# Patient Record
Sex: FEMALE | Race: OTHER | HISPANIC OR LATINO | URBAN - METROPOLITAN AREA
[De-identification: names, ages, dates, MRNs, and addresses within clinical notes are randomized per-mention and may not be internally consistent; named-entity substitution may affect disease eponyms.]

---

## 2021-05-20 ENCOUNTER — EMERGENCY (EMERGENCY)
Facility: HOSPITAL | Age: 36
LOS: 1 days | Discharge: HOME | End: 2021-05-20
Attending: EMERGENCY MEDICINE
Payer: MEDICAID

## 2021-05-20 VITALS
SYSTOLIC BLOOD PRESSURE: 110 MMHG | OXYGEN SATURATION: 97 % | WEIGHT: 142.86 LBS | RESPIRATION RATE: 16 BRPM | DIASTOLIC BLOOD PRESSURE: 74 MMHG | HEART RATE: 68 BPM | TEMPERATURE: 98 F

## 2021-05-20 VITALS
OXYGEN SATURATION: 98 % | SYSTOLIC BLOOD PRESSURE: 100 MMHG | DIASTOLIC BLOOD PRESSURE: 63 MMHG | TEMPERATURE: 99 F | HEART RATE: 58 BPM | RESPIRATION RATE: 18 BRPM

## 2021-05-20 LAB
ALBUMIN SERPL ELPH-MCNC: 3.5 G/DL — SIGNIFICANT CHANGE UP (ref 3.5–5)
ALP SERPL-CCNC: 85 U/L — SIGNIFICANT CHANGE UP (ref 40–120)
ALT FLD-CCNC: 35 U/L DA — SIGNIFICANT CHANGE UP (ref 10–60)
ANION GAP SERPL CALC-SCNC: 7 MMOL/L — SIGNIFICANT CHANGE UP (ref 5–17)
AST SERPL-CCNC: 14 U/L — SIGNIFICANT CHANGE UP (ref 10–40)
BASOPHILS # BLD AUTO: 0.02 K/UL — SIGNIFICANT CHANGE UP (ref 0–0.2)
BASOPHILS NFR BLD AUTO: 0.2 % — SIGNIFICANT CHANGE UP (ref 0–2)
BILIRUB SERPL-MCNC: 0.6 MG/DL — SIGNIFICANT CHANGE UP (ref 0.2–1.2)
BUN SERPL-MCNC: 22 MG/DL — HIGH (ref 7–18)
CALCIUM SERPL-MCNC: 8.8 MG/DL — SIGNIFICANT CHANGE UP (ref 8.4–10.5)
CHLORIDE SERPL-SCNC: 108 MMOL/L — SIGNIFICANT CHANGE UP (ref 96–108)
CO2 SERPL-SCNC: 24 MMOL/L — SIGNIFICANT CHANGE UP (ref 22–31)
CREAT SERPL-MCNC: 0.73 MG/DL — SIGNIFICANT CHANGE UP (ref 0.5–1.3)
D DIMER BLD IA.RAPID-MCNC: 201 NG/ML DDU — SIGNIFICANT CHANGE UP
EOSINOPHIL # BLD AUTO: 0.01 K/UL — SIGNIFICANT CHANGE UP (ref 0–0.5)
EOSINOPHIL NFR BLD AUTO: 0.1 % — SIGNIFICANT CHANGE UP (ref 0–6)
GLUCOSE SERPL-MCNC: 103 MG/DL — HIGH (ref 70–99)
HCG SERPL-ACNC: <1 MIU/ML — SIGNIFICANT CHANGE UP
HCT VFR BLD CALC: 31.4 % — LOW (ref 34.5–45)
HGB BLD-MCNC: 10.1 G/DL — LOW (ref 11.5–15.5)
HIV 1 & 2 AB SERPL IA.RAPID: SIGNIFICANT CHANGE UP
IMM GRANULOCYTES NFR BLD AUTO: 0.4 % — SIGNIFICANT CHANGE UP (ref 0–1.5)
LYMPHOCYTES # BLD AUTO: 2.17 K/UL — SIGNIFICANT CHANGE UP (ref 1–3.3)
LYMPHOCYTES # BLD AUTO: 24.2 % — SIGNIFICANT CHANGE UP (ref 13–44)
MAGNESIUM SERPL-MCNC: 2.2 MG/DL — SIGNIFICANT CHANGE UP (ref 1.6–2.6)
MCHC RBC-ENTMCNC: 27.2 PG — SIGNIFICANT CHANGE UP (ref 27–34)
MCHC RBC-ENTMCNC: 32.2 GM/DL — SIGNIFICANT CHANGE UP (ref 32–36)
MCV RBC AUTO: 84.4 FL — SIGNIFICANT CHANGE UP (ref 80–100)
MONOCYTES # BLD AUTO: 0.91 K/UL — HIGH (ref 0–0.9)
MONOCYTES NFR BLD AUTO: 10.2 % — SIGNIFICANT CHANGE UP (ref 2–14)
NEUTROPHILS # BLD AUTO: 5.8 K/UL — SIGNIFICANT CHANGE UP (ref 1.8–7.4)
NEUTROPHILS NFR BLD AUTO: 64.9 % — SIGNIFICANT CHANGE UP (ref 43–77)
NRBC # BLD: 0 /100 WBCS — SIGNIFICANT CHANGE UP (ref 0–0)
NT-PROBNP SERPL-SCNC: 208 PG/ML — HIGH (ref 0–125)
PLATELET # BLD AUTO: 408 K/UL — HIGH (ref 150–400)
POTASSIUM SERPL-MCNC: 3.8 MMOL/L — SIGNIFICANT CHANGE UP (ref 3.5–5.3)
POTASSIUM SERPL-SCNC: 3.8 MMOL/L — SIGNIFICANT CHANGE UP (ref 3.5–5.3)
PROT SERPL-MCNC: 7.7 G/DL — SIGNIFICANT CHANGE UP (ref 6–8.3)
RBC # BLD: 3.72 M/UL — LOW (ref 3.8–5.2)
RBC # FLD: 13.7 % — SIGNIFICANT CHANGE UP (ref 10.3–14.5)
SODIUM SERPL-SCNC: 139 MMOL/L — SIGNIFICANT CHANGE UP (ref 135–145)
TROPONIN I SERPL-MCNC: <0.015 NG/ML — SIGNIFICANT CHANGE UP (ref 0–0.04)
WBC # BLD: 8.95 K/UL — SIGNIFICANT CHANGE UP (ref 3.8–10.5)
WBC # FLD AUTO: 8.95 K/UL — SIGNIFICANT CHANGE UP (ref 3.8–10.5)

## 2021-05-20 PROCEDURE — 85025 COMPLETE CBC W/AUTO DIFF WBC: CPT

## 2021-05-20 PROCEDURE — 85379 FIBRIN DEGRADATION QUANT: CPT

## 2021-05-20 PROCEDURE — 96374 THER/PROPH/DIAG INJ IV PUSH: CPT

## 2021-05-20 PROCEDURE — 93010 ELECTROCARDIOGRAM REPORT: CPT

## 2021-05-20 PROCEDURE — 99285 EMERGENCY DEPT VISIT HI MDM: CPT

## 2021-05-20 PROCEDURE — 99284 EMERGENCY DEPT VISIT MOD MDM: CPT | Mod: 25

## 2021-05-20 PROCEDURE — 36415 COLL VENOUS BLD VENIPUNCTURE: CPT

## 2021-05-20 PROCEDURE — 84484 ASSAY OF TROPONIN QUANT: CPT

## 2021-05-20 PROCEDURE — 84702 CHORIONIC GONADOTROPIN TEST: CPT

## 2021-05-20 PROCEDURE — 80053 COMPREHEN METABOLIC PANEL: CPT

## 2021-05-20 PROCEDURE — 93005 ELECTROCARDIOGRAM TRACING: CPT

## 2021-05-20 PROCEDURE — 86703 HIV-1/HIV-2 1 RESULT ANTBDY: CPT

## 2021-05-20 PROCEDURE — 83880 ASSAY OF NATRIURETIC PEPTIDE: CPT

## 2021-05-20 PROCEDURE — 83735 ASSAY OF MAGNESIUM: CPT

## 2021-05-20 RX ORDER — SODIUM CHLORIDE 9 MG/ML
1000 INJECTION INTRAMUSCULAR; INTRAVENOUS; SUBCUTANEOUS ONCE
Refills: 0 | Status: COMPLETED | OUTPATIENT
Start: 2021-05-20 | End: 2021-05-20

## 2021-05-20 RX ORDER — KETOROLAC TROMETHAMINE 30 MG/ML
15 SYRINGE (ML) INJECTION ONCE
Refills: 0 | Status: DISCONTINUED | OUTPATIENT
Start: 2021-05-20 | End: 2021-05-20

## 2021-05-20 RX ADMIN — Medication 15 MILLIGRAM(S): at 12:42

## 2021-05-20 RX ADMIN — Medication 15 MILLIGRAM(S): at 13:24

## 2021-05-20 RX ADMIN — SODIUM CHLORIDE 1000 MILLILITER(S): 9 INJECTION INTRAMUSCULAR; INTRAVENOUS; SUBCUTANEOUS at 12:42

## 2021-05-20 NOTE — ED PROVIDER NOTE - PATIENT PORTAL LINK FT
You can access the FollowMyHealth Patient Portal offered by [facility] by registering at the following website: [email or web address]. By joining [employer]’s FollowMyHealth portal, you will also be able to view your health information using other applications (apps) compatible with our system.

## 2021-05-20 NOTE — ED PROVIDER NOTE - PROVIDER TOKENS
PROVIDER:[TOKEN:[[id number]],FOLLOWUP:[1-3 Days]],PROVIDER:[TOKEN:[[id number]],FOLLOWUP:[1-3 Days]]

## 2021-05-20 NOTE — ED PROVIDER NOTE - CLINICAL SUMMARY MEDICAL DECISION MAKING FREE TEXT BOX
Character low suspicion for SAH. No e/o elevated ICP and low suspicion for intracranial mass. No fever or meningeal signs. No e/o glaucoma. Character low suspicion for temporal arteritis. Character low suspicion for CVA or dissection and no focal or localizing findings. Character low suspicion for PE and PERC and D-dimer negative. Character low suspicion for ACS and HEART score 0. No e/o myocarditis/cardiomyopathy. No e/o PNA or PTX on exam or reviewed CXR read. May be 2/2 anxiety in light of high stress trigger and coincidence. Patient is well appearing, NAD, afebrile, hemodynamically stable. Given fluids, Toradol with _______. Any available tests and studies were discussed with patient. Discharged with instructions in further symptomatic care, return precautions, and need for PMD, cardio, neuro f/u. Character low suspicion for SAH. No e/o elevated ICP and low suspicion for intracranial mass. No fever or meningeal signs. No e/o glaucoma. Character low suspicion for temporal arteritis. Character low suspicion for CVA or dissection and no focal or localizing findings. Character low suspicion for PE and PERC and D-dimer negative. Character low suspicion for ACS and HEART score 0. No e/o myocarditis/cardiomyopathy. No e/o PNA or PTX on exam or reviewed CXR read. May be 2/2 anxiety in light of high stress trigger and coincidence. Patient is well appearing, NAD, afebrile, hemodynamically stable. Given fluids, Toradol with improvement and comfortable with discharge. Any available tests and studies were discussed with patient. Discharged with instructions in further symptomatic care, return precautions, and need for PMD f/u - [facility] as needs a doctor with no insurance. Discharge  [id number]

## 2021-05-20 NOTE — ED PROVIDER NOTE - OBJECTIVE STATEMENT
[id number].  36yoF with h/o HTN on enalapril, presents with multiple complaints. States x 4 days having an intermittent nonradiating bitemporal HA, improves with Tylenol then returns. Associated lightheadedness, feeling of fast HR, very intermittent midsternal nonradiating CP and SOB. Onset of symptoms was after waking up from sleep and speaking to her mother in [facility] who told her that her baby was sick, and then a little after that had another stressful call.  has had multiple similar episodes x 3 yrs, triggered by anxiety. Takes a foreign OTC medicine for this without improvement. Had a normal CXR and ECG and negative COVID test at urgent care, which I reviewed. Denies recent long distance travel, OCP use, leg swelling, fever, cough, COVID exposure, Fhx of CAD, and all other symptoms.

## 2021-05-20 NOTE — ED PROVIDER NOTE - CARE PROVIDER_API CALL
Vimal Reveles  CARDIOVASCULAR DISEASE  [address], [address]  [place] [address]  Phone: [phone]  Fax: [phone]  Follow Up Time: 1-3 Days    Grace Brunson  NEUROLOGY  [id number] [address] [place] [id number]  Phone: [phone]  Fax: [phone]  Follow Up Time: 1-3 Days

## 2021-05-20 NOTE — ED ADULT NURSE NOTE - NSIMPLEMENTINTERV_GEN_ALL_ED
Implemented All Universal Safety Interventions:  [employer] to call system. Call bell, personal items and telephone within reach. Instruct patient to call for assistance. Room bathroom lighting operational. Non-slip footwear when patient is off stretcher. Physically safe environment: no spills, clutter or unnecessary equipment. Stretcher in lowest position, wheels locked, appropriate side rails in place.

## 2021-05-20 NOTE — ED PROVIDER NOTE - NSFOLLOWUPINSTRUCTIONS_ED_ALL_ED_FT
Yulisa un seguimiento con pugh médico de atención primaria, neurólogo y cardiólogo en 1-2 días.  Use acetaminofén o ibuprofeno según sea necesario para el dolor.  Manténgase deborah hidratado.  Regrese al departamento de emergencias si el dolor empeora, la falta de aire, los vómitos, la fiebre o cualquier otro síntoma.      Dolor de bisi general sin causa    General Headache Without Cause      El dolor de bisi es un dolor o malestar que se siente en la dodie de la bisi o del tatum. Hay muchas causas y tipos de raghu de bisi. En algunos casos, es posible que no se encuentre la causa.      Siga estas indicaciones en pugh casa:    Controle pugh afección para detectar cualquier cambio. Infórmele a pugh médico acerca de los cambios. Siga estos pasos para controlar pugh afección:      Control del dolor                   •[place] los medicamentos de venta marcio y los recetados solamente chi se lo haya indicado el médico.      •Cuando sienta dolor de bisi acuéstese en un cuarto oscuro y tranquilo.    •Si se lo indican, aplíquese hielo en la bisi y en la dodie del tatum:  •Ponga el hielo en marlon bolsa plástica.      •Coloque marlon toalla entre la piel y la bolsa.      •Coloque el hielo german 20 minutos, 2 a 3 veces al día.      •Si se lo indican, aplique calor en la dodie afectada. Use la camilo de calor que el médico le recomiende, chi marlon compresa de calor húmedo o marlon almohadilla térmica.   • Coloque marlon toalla entre la piel y la camilo de calor.       •Aplique calor german 20 a 30 minutos.       •Retire la camilo de calor si la piel se pone de color antoine brillante. Chadron es muy importante si no puede sentir dolor, calor o frío. Puede correr un riesgo mayor de sufrir quemaduras.        •Mantenga las luces tenues si las luces brillantes le molestan o cady raghu de bisi empeoran.      Comida y bebida     •Mantenga un horario para las comidas.    •Si hill alcohol: •Limite la cantidad que hill a lo siguiente:   •De 0 a 1 medida por día para las mujeres.       • De 0 a 2 medidas por día para los hombres.         •Esté atento a la cantidad de alcohol que hay en las bebidas que apolinar. En los [place], marlon medida equivale a marlon botella de cerveza de 12 oz (355 ml), un vaso de vino de 5 oz (148 ml) o un vaso de marlon bebida alcohólica de cricket graduación de 1½ oz (44 ml).        •Deje de josh cafeína o reduzca la cantidad que consume.        Indicaciones generales    •Lleve un registro diario para averiguar si ciertas cosas provocan los raghu de bisi. Registre, por ejemplo, lo siguiente:  •Lo que usted come y hill.      •El tiempo que duerme.      •Algún cambio en pugh dieta o en los medicamentos.        •Hágase masajes o pruebe otras formas de relajarse.      •Limite el estrés.      •Siéntese con la espalda recta. No contraiga (tensione) los músculos.      • No consuma ningún producto que contenga nicotina o tabaco. Estos incluyen los cigarrillos, el tabaco para mascar y los cigarrillos electrónicos. Si necesita ayuda para dejar de fumar, consulte al médico.      •Yulisa ejercicios con regularidad satnam chi se lo indicó el médico.      •Duerma lo suficiente. Chadron a menudo significa entre 7 y 9 horas de sueño cada noche.      •Concurra a todas las visitas de control chi se lo haya indicado el médico. Chadron es importante.        Comuníquese con un médico si:    •Los medicamentos no logran aliviar los síntomas.      •Tiene un dolor de bisi que es diferente a los otros raghu de bisi.      •Tiene malestar estomacal (náuseas) o vomita.      •Tiene fiebre.        Solicite ayuda inmediatamente si:    •El dolor de bisi empeora rápidamente.      •El dolor empeora después de hacer mucha actividad física.      •Sigue vomitando.      •Presenta rigidez en el tatum.      •Tiene dificultad para ronald.      •Tiene dificultad para hablar.      •Siente dolor en el danii o en el oído.      •Cady músculos están débiles, o pierde el control muscular.      •Pierde el equilibrio o tiene problemas para caminar.      •Siente que va a desvanecerse (perder el conocimiento) o se desmaya.      •Está desorientado (confundido).      •Tiene marlon convulsión.        Resumen    •El dolor de bisi es un dolor o malestar que se siente en la dodie de la bisi o del tatum.      •Hay muchas causas y tipos de raghu de bisi. En algunos casos, es posible que no se encuentre la causa.      •Lleve un diario chi ayuda para determinar la causa de los raghu de bisi. Controle pugh afección para detectar cualquier cambio. Infórmele a pugh médico acerca de los cambios.      •Comuníquese con un médico si tiene un dolor de bisi que es diferente de lo habitual o si el dolor de bisi no se raul con los medicamentos.      •Solicite ayuda de inmediato si el dolor de bisi es muy intenso, vomita, tiene dificultad para ronald, pierde el equilibrio o tiene marlon convulsión.      Esta información no tiene chi fin reemplazar el consejo del médico. Asegúrese de hacerle al médico cualquier pregunta que tenga. Yulisa un seguimiento con pugh médico de atención primaria en 1-2 días.  Use acetaminofén o ibuprofeno según sea necesario para el dolor.  Manténgase deborah hidratado.  Regrese al departamento de emergencias si el dolor empeora, la falta de aire, los vómitos, la fiebre o cualquier otro síntoma.      Dolor de bisi general sin causa    General Headache Without Cause      El dolor de bisi es un dolor o malestar que se siente en la dodie de la bisi o del tatum. Hay muchas causas y tipos de raghu de bisi. En algunos casos, es posible que no se encuentre la causa.      Siga estas indicaciones en pugh casa:    Controle pugh afección para detectar cualquier cambio. Infórmele a pugh médico acerca de los cambios. Siga estos pasos para controlar pugh afección:      Control del dolor                   •[place] los medicamentos de venta marcio y los recetados solamente chi se lo haya indicado el médico.      •Cuando sienta dolor de bisi acuéstese en un cuarto oscuro y tranquilo.    •Si se lo indican, aplíquese hielo en la bisi y en la dodie del tatum:  •Ponga el hielo en marlon bolsa plástica.      •Coloque marlon toalla entre la piel y la bolsa.      •Coloque el hielo german 20 minutos, 2 a 3 veces al día.      •Si se lo indican, aplique calor en la dodie afectada. Use la camilo de calor que el médico le recomiende, chi marlon compresa de calor húmedo o marlon almohadilla térmica.   • Coloque marlon toalla entre la piel y la camilo de calor.       •Aplique calor german 20 a 30 minutos.       •Retire la camilo de calor si la piel se pone de color antoine brillante. [place] es muy importante si no puede sentir dolor, calor o frío. Puede correr un riesgo mayor de sufrir quemaduras.        •Mantenga las luces tenues si las luces brillantes le molestan o cady raghu de bisi empeoran.      Comida y bebida     •Mantenga un horario para las comidas.    •Si hill alcohol: •Limite la cantidad que hill a lo siguiente:   •De 0 a 1 medida por día para las mujeres.       • De 0 a 2 medidas por día para los hombres.         •Esté atento a la cantidad de alcohol que hay en las bebidas que apolinar. En [place], marlon medida equivale a marlon botella de cerveza de 12 oz (355 ml), un vaso de vino de 5 oz (148 ml) o un vaso de marlon bebida alcohólica de cricket graduación de 1½ oz (44 ml).        •Deje de josh cafeína o reduzca la cantidad que consume.        Indicaciones generales    •Lleve un registro diario para averiguar si ciertas cosas provocan los raghu de bisi. Registre, por ejemplo, lo siguiente:  •Lo que usted come y hill.      •El tiempo que duerme.      •Algún cambio en pugh dieta o en los medicamentos.        •Hágase masajes o pruebe otras formas de relajarse.      •Limite el estrés.      •Siéntese con la espalda recta. No contraiga (tensione) los músculos.      • No consuma ningún producto que contenga nicotina o tabaco. Estos incluyen los cigarrillos, el tabaco para mascar y los cigarrillos electrónicos. Si necesita ayuda para dejar de fumar, consulte al médico.      •Yulisa ejercicios con regularidad satnam chi se lo indicó el médico.      •Duerma lo suficiente. [place] a menudo significa entre 7 y 9 horas de sueño cada noche.      •Concurra a todas las visitas de control chi se lo haya indicado el médico. [place] es importante.        Comuníquese con un médico si:    •Los medicamentos no logran aliviar los síntomas.      •Tiene un dolor de bisi que es diferente a los otros raghu de bisi.      •Tiene malestar estomacal (náuseas) o vomita.      •Tiene fiebre.        Solicite ayuda inmediatamente si:    •El dolor de bisi empeora rápidamente.      •El dolor empeora después de hacer mucha actividad física.      •Sigue vomitando.      •Presenta rigidez en el tatum.      •Tiene dificultad para ronald.      •Tiene dificultad para hablar.      •Siente dolor en el danii o en el oído.      •Cady músculos están débiles, o pierde el control muscular.      •Pierde el equilibrio o tiene problemas para caminar.      •Siente que va a desvanecerse (perder el conocimiento) o se desmaya.      •Está desorientado (confundido).      •Tiene marlon convulsión.        Resumen    •El dolor de bisi es un dolor o malestar que se siente en la dodie de la bisi o del tatum.      •Hay muchas causas y tipos de raghu de bisi. En algunos casos, es posible que no se encuentre la causa.      •Lleve un diario chi ayuda para determinar la causa de los raghu de bisi. Controle pugh afección para detectar cualquier cambio. Infórmele a pugh médico acerca de los cambios.      •Comuníquese con un médico si tiene un dolor de bisi que es diferente de lo habitual o si el dolor de bisi no se raul con los medicamentos.      •Solicite ayuda de inmediato si el dolor de bisi es muy intenso, vomita, tiene dificultad para ronald, pierde el equilibrio o tiene marlon convulsión.      Esta información no tiene chi fin reemplazar el consejo del médico. Asegúrese de hacerle al médico cualquier pregunta que tenga.

## 2021-05-20 NOTE — ED PROVIDER NOTE - NSFOLLOWUPCLINICS_GEN_ALL_ED_FT
[facility]  Internal Medicine  [address] [place] [address]  Phone: [phone]  Fax: [phone]  Follow Up Time: 1-3 Days

## 2021-05-20 NOTE — ED PROVIDER NOTE - PHYSICAL EXAMINATION
Afebrile, hemodynamically stable, saturating well  NAD, well appearing, sitting comfortably in bed  Head NCAT  EOMI grossly, anicteric  MMM  No JVD  RRR, nml S1/S2, no m/r/g  Lungs CTAB, no w/r/r  Abd soft, NT, ND, nml BS, no rebound or guarding  AAO, CN's 3-12 grossly intact  FAM spontaneously, no leg cyanosis or edema  Skin warm, well perfused, no rashes or hives

## 2021-06-17 ENCOUNTER — APPOINTMENT (OUTPATIENT)
Dept: INTERNAL MEDICINE | Facility: CLINIC | Age: 36
End: 2021-06-17

## 2021-06-17 ENCOUNTER — OUTPATIENT (OUTPATIENT)
Dept: OUTPATIENT SERVICES | Facility: HOSPITAL | Age: 36
LOS: 1 days | End: 2021-06-17
Payer: MEDICAID

## 2021-06-17 VITALS
DIASTOLIC BLOOD PRESSURE: 80 MMHG | BODY MASS INDEX: 30.01 KG/M2 | TEMPERATURE: 98 F | WEIGHT: 141 LBS | RESPIRATION RATE: 16 BRPM | HEIGHT: 57.5 IN | OXYGEN SATURATION: 99 % | HEART RATE: 68 BPM | SYSTOLIC BLOOD PRESSURE: 121 MMHG

## 2021-06-17 DIAGNOSIS — R51.9 HEADACHE, UNSPECIFIED: ICD-10-CM

## 2021-06-17 DIAGNOSIS — Z82.49 FAMILY HISTORY OF ISCHEMIC HEART DISEASE AND OTHER DISEASES OF THE CIRCULATORY SYSTEM: ICD-10-CM

## 2021-06-17 DIAGNOSIS — Z83.3 FAMILY HISTORY OF DIABETES MELLITUS: ICD-10-CM

## 2021-06-17 DIAGNOSIS — Z00.00 ENCOUNTER FOR GENERAL ADULT MEDICAL EXAMINATION WITHOUT ABNORMAL FINDINGS: ICD-10-CM

## 2021-06-17 DIAGNOSIS — F32.9 MAJOR DEPRESSIVE DISORDER, SINGLE EPISODE, UNSPECIFIED: ICD-10-CM

## 2021-06-17 DIAGNOSIS — R00.2 PALPITATIONS: ICD-10-CM

## 2021-06-17 DIAGNOSIS — F41.9 ANXIETY DISORDER, UNSPECIFIED: ICD-10-CM

## 2021-06-17 DIAGNOSIS — R07.9 CHEST PAIN, UNSPECIFIED: ICD-10-CM

## 2021-06-17 DIAGNOSIS — Z78.9 OTHER SPECIFIED HEALTH STATUS: ICD-10-CM

## 2021-06-17 PROCEDURE — 84443 ASSAY THYROID STIM HORMONE: CPT

## 2021-06-17 PROCEDURE — 36415 COLL VENOUS BLD VENIPUNCTURE: CPT

## 2021-06-17 PROCEDURE — 80061 LIPID PANEL: CPT

## 2021-06-17 PROCEDURE — G0463: CPT

## 2021-07-21 ENCOUNTER — NON-APPOINTMENT (OUTPATIENT)
Age: 36
End: 2021-07-21

## 2021-07-21 LAB
CHOLEST SERPL-MCNC: 165 MG/DL
HDLC SERPL-MCNC: 36 MG/DL
LDLC SERPL CALC-MCNC: 73 MG/DL
NONHDLC SERPL-MCNC: 130 MG/DL
TRIGL SERPL-MCNC: 280 MG/DL
TSH SERPL-ACNC: 1.72 UIU/ML

## 2021-07-21 NOTE — HEALTH RISK ASSESSMENT
[No] : In the past 12 months have you used drugs other than those required for medical reasons? No [1] : 2) Feeling down, depressed, or hopeless for several days (1) [HIV test declined] : HIV test declined [Hepatitis C test declined] : Hepatitis C test declined [] : No [[id number]] : 2

## 2021-07-21 NOTE — REVIEW OF SYSTEMS
[Suicidal] : not suicidal [Insomnia] : no insomnia [Anxiety] : anxiety [Depression] : depression [Negative] : Integumentary [FreeTextEntry5] : as per HPI [de-identified] : as per HPI

## 2021-07-21 NOTE — PHYSICAL EXAM
[Normal] : normal gait, coordination grossly intact, no focal deficits and deep tendon reflexes were 2+ and symmetric [Normal Affect] : the affect was normal [Alert and Oriented x3] : oriented to person, place, and time [Normal Insight/Judgement] : insight and judgment were intact [de-identified] : anxious

## 2021-07-21 NOTE — ASSESSMENT
[FreeTextEntry1] : information provide for pt to apply for Medicaid or sliding scale; \par \par COVDI19 SHOT X2 \par \par HA: likely tension HA; Tylenol; warm compress\par 1 months follow up \par \par CP and palpitation; cxr in urgent care wnl; EKG in ED grossly wnl;' trop in ED negative\par FHx of cardio disease;refer tto cardio \par 1 month follow up\par \par anxiety and depression: \par refer to psych \par \par -Cautions to ED and follow back discussed with pt in details\par  Detail Level: Detailed Body Location Override (Optional - Billing Will Still Be Based On Selected Body Map Location If Applicable): upper L arm Size Of Lesion In Cm (Optional): 0 Body Location Override (Optional - Billing Will Still Be Based On Selected Body Map Location If Applicable): L nasal sidewall

## 2021-07-22 ENCOUNTER — APPOINTMENT (OUTPATIENT)
Dept: OBGYN | Facility: CLINIC | Age: 36
End: 2021-07-22
Payer: COMMERCIAL

## 2021-07-22 ENCOUNTER — OUTPATIENT (OUTPATIENT)
Dept: OUTPATIENT SERVICES | Facility: HOSPITAL | Age: 36
LOS: 1 days | End: 2021-07-22
Payer: SELF-PAY

## 2021-07-22 VITALS
TEMPERATURE: 98.3 F | WEIGHT: 134 LBS | SYSTOLIC BLOOD PRESSURE: 98 MMHG | DIASTOLIC BLOOD PRESSURE: 64 MMHG | HEART RATE: 69 BPM | HEIGHT: 57.5 IN | BODY MASS INDEX: 28.51 KG/M2 | OXYGEN SATURATION: 99 %

## 2021-07-22 DIAGNOSIS — R10.2 PELVIC AND PERINEAL PAIN: ICD-10-CM

## 2021-07-22 DIAGNOSIS — Z00.00 ENCOUNTER FOR GENERAL ADULT MEDICAL EXAMINATION WITHOUT ABNORMAL FINDINGS: ICD-10-CM

## 2021-07-22 PROCEDURE — 87624 HPV HI-RISK TYP POOLED RSLT: CPT

## 2021-07-22 PROCEDURE — 87625 HPV TYPES 16 & 18 ONLY: CPT

## 2021-07-22 PROCEDURE — 87591 N.GONORRHOEAE DNA AMP PROB: CPT

## 2021-07-22 PROCEDURE — 87491 CHLMYD TRACH DNA AMP PROBE: CPT

## 2021-07-22 PROCEDURE — 99203 OFFICE O/P NEW LOW 30 MIN: CPT | Mod: NC

## 2021-07-22 PROCEDURE — G0463: CPT

## 2021-07-22 NOTE — HISTORY OF PRESENT ILLNESS
[Y] : Positive pregnancy history [FreeTextEntry1] : new gyn visit.\par right lower pelvic pain once or twice a week.\par long periods(10days. 3 days heavy and 7 days light.) x 6mo\par \par \par \par  x1\par C/S x2 and BTL\par Laparotomy Left ov cystectomy 14yrs ago for egg size ov cyst.\par \par  [LMPDate] : 5/12/2021 [MensesFreq] : 28 [MensesLength] : 10 [PGHxTotal] : 3 [[facility]] : 2 [PGxPremature] : 1 [PGHxAbortions] : 0 [[facility] Centeriving] : 3 [PGHxABInduced] : 0 [PGHxABSpont] : 0 [PGHxEctopic] : 0 [PGHxMultBirths] : 0

## 2021-07-23 DIAGNOSIS — R10.2 PELVIC AND PERINEAL PAIN: ICD-10-CM

## 2021-07-23 DIAGNOSIS — N92.6 IRREGULAR MENSTRUATION, UNSPECIFIED: ICD-10-CM

## 2021-07-26 LAB
C TRACH RRNA SPEC QL NAA+PROBE: NOT DETECTED
HPV HIGH+LOW RISK DNA PNL CVX: NOT DETECTED
N GONORRHOEA RRNA SPEC QL NAA+PROBE: NOT DETECTED
SOURCE TP AMPLIFICATION: NORMAL

## 2021-07-30 LAB — CYTOLOGY CVX/VAG DOC THIN PREP: ABNORMAL

## 2021-08-02 ENCOUNTER — APPOINTMENT (OUTPATIENT)
Dept: ULTRASOUND IMAGING | Facility: HOSPITAL | Age: 36
End: 2021-08-02

## 2021-08-05 ENCOUNTER — APPOINTMENT (OUTPATIENT)
Dept: OBGYN | Facility: CLINIC | Age: 36
End: 2021-08-05

## 2021-08-05 VITALS
OXYGEN SATURATION: 97 % | WEIGHT: 135 LBS | HEIGHT: 57.5 IN | RESPIRATION RATE: 18 BRPM | DIASTOLIC BLOOD PRESSURE: 72 MMHG | HEART RATE: 91 BPM | SYSTOLIC BLOOD PRESSURE: 107 MMHG | TEMPERATURE: 97.5 F | BODY MASS INDEX: 28.72 KG/M2

## 2021-08-12 ENCOUNTER — APPOINTMENT (OUTPATIENT)
Dept: ULTRASOUND IMAGING | Facility: HOSPITAL | Age: 36
End: 2021-08-12
Payer: COMMERCIAL

## 2021-08-12 ENCOUNTER — OUTPATIENT (OUTPATIENT)
Dept: OUTPATIENT SERVICES | Facility: HOSPITAL | Age: 36
LOS: 1 days | End: 2021-08-12
Payer: SELF-PAY

## 2021-08-12 DIAGNOSIS — R10.2 PELVIC AND PERINEAL PAIN: ICD-10-CM

## 2021-08-12 PROCEDURE — 76830 TRANSVAGINAL US NON-OB: CPT | Mod: 26

## 2021-08-12 PROCEDURE — 76830 TRANSVAGINAL US NON-OB: CPT

## 2021-08-12 PROCEDURE — 76856 US EXAM PELVIC COMPLETE: CPT | Mod: 26

## 2021-08-12 PROCEDURE — 76856 US EXAM PELVIC COMPLETE: CPT

## 2021-08-19 ENCOUNTER — APPOINTMENT (OUTPATIENT)
Dept: INTERNAL MEDICINE | Facility: CLINIC | Age: 36
End: 2021-08-19

## 2021-08-19 ENCOUNTER — OUTPATIENT (OUTPATIENT)
Dept: OUTPATIENT SERVICES | Facility: HOSPITAL | Age: 36
LOS: 1 days | End: 2021-08-19
Payer: SELF-PAY

## 2021-08-19 VITALS
SYSTOLIC BLOOD PRESSURE: 106 MMHG | TEMPERATURE: 97.4 F | RESPIRATION RATE: 18 BRPM | DIASTOLIC BLOOD PRESSURE: 69 MMHG | OXYGEN SATURATION: 97 % | HEART RATE: 73 BPM | HEIGHT: 57.5 IN | BODY MASS INDEX: 28.94 KG/M2 | WEIGHT: 136 LBS

## 2021-08-19 DIAGNOSIS — M25.549 PAIN IN JOINTS OF UNSPECIFIED HAND: ICD-10-CM

## 2021-08-19 DIAGNOSIS — R21 RASH AND OTHER NONSPECIFIC SKIN ERUPTION: ICD-10-CM

## 2021-08-19 DIAGNOSIS — R07.9 CHEST PAIN, UNSPECIFIED: ICD-10-CM

## 2021-08-19 DIAGNOSIS — R51.9 HEADACHE, UNSPECIFIED: ICD-10-CM

## 2021-08-19 DIAGNOSIS — Z00.00 ENCOUNTER FOR GENERAL ADULT MEDICAL EXAMINATION WITHOUT ABNORMAL FINDINGS: ICD-10-CM

## 2021-08-19 DIAGNOSIS — R00.2 PALPITATIONS: ICD-10-CM

## 2021-08-19 DIAGNOSIS — F32.9 MAJOR DEPRESSIVE DISORDER, SINGLE EPISODE, UNSPECIFIED: ICD-10-CM

## 2021-08-19 DIAGNOSIS — E78.1 PURE HYPERGLYCERIDEMIA: ICD-10-CM

## 2021-08-19 DIAGNOSIS — F41.9 ANXIETY DISORDER, UNSPECIFIED: ICD-10-CM

## 2021-08-19 PROCEDURE — 86140 C-REACTIVE PROTEIN: CPT

## 2021-08-19 PROCEDURE — 86225 DNA ANTIBODY NATIVE: CPT

## 2021-08-19 PROCEDURE — 86235 NUCLEAR ANTIGEN ANTIBODY: CPT

## 2021-08-19 PROCEDURE — G0463: CPT

## 2021-08-19 PROCEDURE — 85652 RBC SED RATE AUTOMATED: CPT

## 2021-08-19 PROCEDURE — 86200 CCP ANTIBODY: CPT

## 2021-08-19 PROCEDURE — 80053 COMPREHEN METABOLIC PANEL: CPT

## 2021-08-19 PROCEDURE — 86431 RHEUMATOID FACTOR QUANT: CPT

## 2021-08-19 PROCEDURE — 86038 ANTINUCLEAR ANTIBODIES: CPT

## 2021-08-19 PROCEDURE — 85025 COMPLETE CBC W/AUTO DIFF WBC: CPT

## 2021-08-19 PROCEDURE — 86255 FLUORESCENT ANTIBODY SCREEN: CPT

## 2021-08-19 NOTE — PHYSICAL EXAM
[Normal] : normal gait, coordination grossly intact, no focal deficits and deep tendon reflexes were 2+ and symmetric [Normal Affect] : the affect was normal [Alert and Oriented x3] : oriented to person, place, and time [Normal Insight/Judgement] : insight and judgment were intact [de-identified] : anxious

## 2021-08-19 NOTE — HISTORY OF PRESENT ILLNESS
[[employer] ] : provided by [employer]   [de-identified] : 36 Y.O Maltese speaking  F  W/PMHx of chronic HA today for follow  up \par \par went to ED in 05/2021 for multiple complains with palpitation; HA and anxiety; \par had EKG and cbc and cmp and HIV and UA AND PRO bnp/ TROP/dDIMER done shOWed only mild normocytic anemia \par ALSO HAD CXR AND COVID19 TEST DONE IN URGENT CARE BEFORE GO TO ED SHOWED NORMAL RESULT PER ED PROVIDER'S NOTE \par \par \par HA: chronic for 1 yr and half since the pandemic; daily; a lot of stress; no n/v ; no syncope; no dizziness; no focal deficit; no fever or chills; b/l frontal; reported phonophobia  but no photophobia; no migraine HA; Tylenol help; \par now reported that she is still had a lot of HA; now is daily HA and wake her up at night; \par \par palpitation: especially climb the stairs; for 1 months; \par also reported  midsternal CP associate with exertion; no sob; the pain is not reproducible; hx OF Unknown CARDIO DISEASE OF MOTHER WHO IS NEED OPEN HEART SURGERY; MOTHER HAS Been DIAGNOSED WITH CARDIO DISEASE AT AGE 50S \par \par \par anxiety: FEEL A LOT OF STRESS FOR 6 MONTHS; \par GAD7=9 no kike/ no hallucination/ No delusions/ no suicidal/no harmful ideation\par feel desperation since her child is in [facility]  she is worried and her mother has hx of acardiac surgery; \par PHQ9=7\par Denies OF ANY COVID19 INFECTION; \par \par now reported b/l hands joints pain as swelling intermittent for 3 yrs and morning stiffness; no injury or erythema or numbness or tingling \par  [FreeTextEntry1] : [id number] [TWNoteComboBox1] : [place]

## 2021-08-19 NOTE — REVIEW OF SYSTEMS
[Anxiety] : anxiety [Depression] : depression [Negative] : Integumentary [Suicidal] : not suicidal [Insomnia] : no insomnia [FreeTextEntry5] : as per HPI [de-identified] : as per HPI

## 2021-08-19 NOTE — ASSESSMENT
[FreeTextEntry1] : information provide for pt to apply for Medicaid or sliding scale; \par \par COVDI19 SHOT X2 \par \par HA: now daily and wak up at night; \par CNsII-XII grossly wnl; HINTs negative; no cerebellar drift; normal heel to shin test\par will check MRI\par -Cautions to ED and follow back discussed with pt  in details\par \par \par CP and palpitation; cxr in urgent care wnl; EKG in ED grossly wnl;' trop in ED negative\par FHx of cardio disease;refer to cardio \par 1 month follow up\par \par joints pain and stiffness:\par labs as ordered\par check x ray hand \par \par anxiety and depression: \par \par -discussed with pt the option of treatment\par -pt denies of current pregnancy or breast feeding\par -start Lexapro\par -discussed with pt in details that the possible side effect  and adverse reactions of the SSRI medication; pt aware that the medication might increase suicidal ideation at early phase; pt also agree to call 911 immediately if she experience suicidal or harmful thought; pt also understand that if she want to stop this medication, she need to call the clinic back for further instruction\par -refer to psychologist and psychiatrist\par -follow up in 3 weeks\par \par \par infor for good RX provided \par

## 2021-08-19 NOTE — HEALTH RISK ASSESSMENT
[No] : In the past 12 months have you used drugs other than those required for medical reasons? No [1] : 2) Feeling down, depressed, or hopeless for several days (1) [HIV test declined] : HIV test declined [Hepatitis C test declined] : Hepatitis C test declined [Patient reported PAP Smear was normal] : Patient reported PAP Smear was normal [] : No [[id number]] : 2 [PapSmearDate] : 07/2021

## 2021-08-26 ENCOUNTER — OUTPATIENT (OUTPATIENT)
Dept: OUTPATIENT SERVICES | Facility: HOSPITAL | Age: 36
LOS: 1 days | End: 2021-08-26
Payer: SELF-PAY

## 2021-08-26 ENCOUNTER — APPOINTMENT (OUTPATIENT)
Dept: OBGYN | Facility: CLINIC | Age: 36
End: 2021-08-26
Payer: COMMERCIAL

## 2021-08-26 VITALS
TEMPERATURE: 97.2 F | BODY MASS INDEX: 28.94 KG/M2 | WEIGHT: 136 LBS | HEIGHT: 57.5 IN | HEART RATE: 75 BPM | OXYGEN SATURATION: 96 % | RESPIRATION RATE: 16 BRPM | DIASTOLIC BLOOD PRESSURE: 76 MMHG | SYSTOLIC BLOOD PRESSURE: 114 MMHG

## 2021-08-26 DIAGNOSIS — Z00.00 ENCOUNTER FOR GENERAL ADULT MEDICAL EXAMINATION WITHOUT ABNORMAL FINDINGS: ICD-10-CM

## 2021-08-26 DIAGNOSIS — N92.6 IRREGULAR MENSTRUATION, UNSPECIFIED: ICD-10-CM

## 2021-08-26 PROCEDURE — 99212 OFFICE O/P EST SF 10 MIN: CPT

## 2021-08-26 PROCEDURE — G0463: CPT

## 2021-08-26 NOTE — HISTORY OF PRESENT ILLNESS
[FreeTextEntry1] : PT is here to discuss results.\par Reports no longer had pelvic pain.\par Last period was normal, lasted for 4 days.\par \par Pap 7/22/21 wnl, hpv neg\par Pelvic sono 8/12/21 Ut no fibroids, em 4mm. 1.4cm follicular cyst R ov

## 2021-08-30 DIAGNOSIS — N92.6 IRREGULAR MENSTRUATION, UNSPECIFIED: ICD-10-CM

## 2021-09-02 ENCOUNTER — OUTPATIENT (OUTPATIENT)
Dept: OUTPATIENT SERVICES | Facility: HOSPITAL | Age: 36
LOS: 1 days | End: 2021-09-02
Payer: SELF-PAY

## 2021-09-02 ENCOUNTER — APPOINTMENT (OUTPATIENT)
Dept: MRI IMAGING | Facility: HOSPITAL | Age: 36
End: 2021-09-02
Payer: MEDICAID

## 2021-09-02 DIAGNOSIS — R51.9 HEADACHE, UNSPECIFIED: ICD-10-CM

## 2021-09-02 PROCEDURE — 70553 MRI BRAIN STEM W/O & W/DYE: CPT | Mod: 26

## 2021-09-02 PROCEDURE — 73130 X-RAY EXAM OF HAND: CPT | Mod: 26,50

## 2021-09-02 PROCEDURE — 70553 MRI BRAIN STEM W/O & W/DYE: CPT

## 2021-09-02 PROCEDURE — 73130 X-RAY EXAM OF HAND: CPT

## 2021-09-09 ENCOUNTER — OUTPATIENT (OUTPATIENT)
Dept: OUTPATIENT SERVICES | Facility: HOSPITAL | Age: 36
LOS: 1 days | End: 2021-09-09
Payer: SELF-PAY

## 2021-09-09 ENCOUNTER — APPOINTMENT (OUTPATIENT)
Dept: INTERNAL MEDICINE | Facility: CLINIC | Age: 36
End: 2021-09-09

## 2021-09-09 VITALS
DIASTOLIC BLOOD PRESSURE: 61 MMHG | WEIGHT: 138.2 LBS | SYSTOLIC BLOOD PRESSURE: 103 MMHG | BODY MASS INDEX: 29.41 KG/M2 | OXYGEN SATURATION: 98 % | HEART RATE: 73 BPM | HEIGHT: 57.5 IN | RESPIRATION RATE: 18 BRPM | TEMPERATURE: 97.3 F

## 2021-09-09 DIAGNOSIS — Z00.00 ENCOUNTER FOR GENERAL ADULT MEDICAL EXAMINATION WITHOUT ABNORMAL FINDINGS: ICD-10-CM

## 2021-09-09 DIAGNOSIS — R51.9 HEADACHE, UNSPECIFIED: ICD-10-CM

## 2021-09-09 DIAGNOSIS — F41.9 ANXIETY DISORDER, UNSPECIFIED: ICD-10-CM

## 2021-09-09 DIAGNOSIS — F32.9 MAJOR DEPRESSIVE DISORDER, SINGLE EPISODE, UNSPECIFIED: ICD-10-CM

## 2021-09-09 DIAGNOSIS — M25.549 PAIN IN JOINTS OF UNSPECIFIED HAND: ICD-10-CM

## 2021-09-09 DIAGNOSIS — R00.2 PALPITATIONS: ICD-10-CM

## 2021-09-09 LAB
ALBUMIN SERPL ELPH-MCNC: 4.4 G/DL
ALP BLD-CCNC: 84 U/L
ALT SERPL-CCNC: 22 U/L
ANA PAT FLD IF-IMP: ABNORMAL
ANA SER IF-ACNC: ABNORMAL
ANION GAP SERPL CALC-SCNC: 12 MMOL/L
AST SERPL-CCNC: 18 U/L
BASOPHILS # BLD AUTO: 0.02 K/UL
BASOPHILS NFR BLD AUTO: 0.3 %
BILIRUB SERPL-MCNC: 0.4 MG/DL
BUN SERPL-MCNC: 14 MG/DL
CALCIUM SERPL-MCNC: 9.4 MG/DL
CCP AB SER IA-ACNC: >250 UNITS
CENTROMERE IGG SER-ACNC: 2.9 CD:130001892
CHLORIDE SERPL-SCNC: 107 MMOL/L
CO2 SERPL-SCNC: 23 MMOL/L
CREAT SERPL-MCNC: 0.73 MG/DL
CRP SERPL-MCNC: 10 MG/L
DSDNA AB SER-ACNC: <12 IU/ML
ENA JO1 AB SER IA-ACNC: <0.2 AL
ENA RNP AB SER IA-ACNC: 0.5 AL
ENA SM AB SER IA-ACNC: <0.2 AL
EOSINOPHIL # BLD AUTO: 0.15 K/UL
EOSINOPHIL NFR BLD AUTO: 2 %
ERYTHROCYTE [SEDIMENTATION RATE] IN BLOOD BY WESTERGREN METHOD: 32 MM/HR
GLUCOSE SERPL-MCNC: 116 MG/DL
HCT VFR BLD CALC: 34.2 %
HGB BLD-MCNC: 10.5 G/DL
IMM GRANULOCYTES NFR BLD AUTO: 0.3 %
LYMPHOCYTES # BLD AUTO: 1.31 K/UL
LYMPHOCYTES NFR BLD AUTO: 17.3 %
MAN DIFF?: NORMAL
MCHC RBC-ENTMCNC: 27.3 PG
MCHC RBC-ENTMCNC: 30.7 GM/DL
MCV RBC AUTO: 89.1 FL
MONOCYTES # BLD AUTO: 0.67 K/UL
MONOCYTES NFR BLD AUTO: 8.8 %
NEUTROPHILS # BLD AUTO: 5.42 K/UL
NEUTROPHILS NFR BLD AUTO: 71.3 %
PLATELET # BLD AUTO: 333 K/UL
POTASSIUM SERPL-SCNC: 4.2 MMOL/L
PROT SERPL-MCNC: 6.9 G/DL
RBC # BLD: 3.84 M/UL
RBC # FLD: 14.9 %
RF+CCP IGG SER-IMP: ABNORMAL
RHEUMATOID FACT SER QL: >650 IU/ML
SODIUM SERPL-SCNC: 141 MMOL/L
WBC # FLD AUTO: 7.59 K/UL

## 2021-09-09 PROCEDURE — G0463: CPT

## 2021-09-09 NOTE — ASSESSMENT
[FreeTextEntry1] : information provide for pt to apply for Medicaid or sliding scale; \par \par COVDI19 SHOT X2 \par \par HA: now daily and wake up at night; \par CNsII-XII grossly wnl; HINTs negative; no cerebellar drift; normal heel to shin test\par  MRI wnl\par -Cautions to ED and follow back discussed with pt  in details\par \par \par CP and palpitation; cxr in urgent care wnl; EKG in ED grossly wnl;' trop in ED negative\par FHx of cardio disease;refer to cardio again\par \par \par joints pain and stiffness:\par labs showed rheumatoid disease \par check x ray hand wnl\par refer to rheumatologist \par \par anxiety and depression: \par \par -discussed with pt the option of treatment\par -pt denies of current pregnancy or breast feeding\par -improving after taking Lexapro\par -discussed with pt in details that the possible side effect  and adverse reactions of the SSRI medication; pt aware that the medication might increase suicidal ideation at early phase; pt also agree to call 911 immediately if she experience suicidal or harmful thought; pt also understand that if she want to stop this medication, she need to call the clinic back for further instruction\par -refer to psychologist and psychiatrist\par -follow up in 2 months \par \par \par infor for good RX provided \par

## 2021-09-09 NOTE — HEALTH RISK ASSESSMENT
[No] : In the past 12 months have you used drugs other than those required for medical reasons? No [1] : 2) Feeling down, depressed, or hopeless for several days (1) [Patient reported PAP Smear was normal] : Patient reported PAP Smear was normal [HIV test declined] : HIV test declined [Hepatitis C test declined] : Hepatitis C test declined [] : No [[id number]] : 2 [PapSmearDate] : 07/2021

## 2021-09-09 NOTE — PHYSICAL EXAM
[Normal] : normal gait, coordination grossly intact, no focal deficits and deep tendon reflexes were 2+ and symmetric [Normal Affect] : the affect was normal [Alert and Oriented x3] : oriented to person, place, and time [Normal Insight/Judgement] : insight and judgment were intact [de-identified] : anxious

## 2021-09-09 NOTE — HISTORY OF PRESENT ILLNESS
[[employer] ] : provided by [employer]   [de-identified] : 36 Y.O Bengali speaking  F  W/PMHx of chronic HA today for follow  up \par \par went to ED in 05/2021 for multiple complains with palpitation; HA and anxiety; \par had EKG and cbc and cmp and HIV and UA AND PRO bnp/ TROP/dDIMER done shOWed only mild normocytic anemia \par ALSO HAD CXR AND COVID19 TEST DONE IN URGENT CARE BEFORE GO TO ED SHOWED NORMAL RESULT PER ED PROVIDER'S NOTE \par \par \par HA: chronic for 1 yr and half since the pandemic; daily; a lot of stress; no n/v ; no syncope; no dizziness; no focal deficit; no fever or chills; b/l frontal; reported phonophobia  but no photophobia; no migraine HA; Tylenol help; \par now reported that she is still had a lot of HA; now is daily HA and wake her up at night; \par \par palpitation: especially climb the stairs; for 1 months; \par also reported  midsternal CP associate with exertion; no sob; the pain is not reproducible; hx OF Unknown CARDIO DISEASE OF MOTHER WHO IS NEED OPEN HEART SURGERY; MOTHER HAS Been DIAGNOSED WITH CARDIO DISEASE AT AGE 50S \par \par \par anxiety: FEEL A LOT OF STRESS FOR 6 MONTHS; \par GAD7=9 no kike/ no hallucination/ No delusions/ no suicidal/no harmful ideation\par feel desperation since her child is in [facility]  she is worried and her mother has hx of acardiac surgery; \par PHQ9=7\par Denies OF ANY COVID19 INFECTION; \par \par now reported b/l hands joints pain as swelling intermittent for 3 yrs and morning stiffness; no injury or erythema or numbness or tingling \par  [FreeTextEntry1] : [id number] [TWNoteComboBox1] : Macedonian

## 2021-09-09 NOTE — REVIEW OF SYSTEMS
[Anxiety] : anxiety [Depression] : depression [Negative] : Integumentary [Suicidal] : not suicidal [Insomnia] : no insomnia [FreeTextEntry5] : as per HPI [de-identified] : as per HPI

## 2021-09-10 DIAGNOSIS — R00.2 PALPITATIONS: ICD-10-CM

## 2021-09-10 DIAGNOSIS — F41.9 ANXIETY DISORDER, UNSPECIFIED: ICD-10-CM

## 2021-09-10 DIAGNOSIS — R51.9 HEADACHE, UNSPECIFIED: ICD-10-CM

## 2021-09-10 DIAGNOSIS — R07.9 CHEST PAIN, UNSPECIFIED: ICD-10-CM

## 2021-09-10 DIAGNOSIS — M25.549 PAIN IN JOINTS OF UNSPECIFIED HAND: ICD-10-CM

## 2021-09-10 DIAGNOSIS — F32.9 MAJOR DEPRESSIVE DISORDER, SINGLE EPISODE, UNSPECIFIED: ICD-10-CM

## 2021-09-10 DIAGNOSIS — M06.9 RHEUMATOID ARTHRITIS, UNSPECIFIED: ICD-10-CM

## 2021-10-08 ENCOUNTER — OUTPATIENT (OUTPATIENT)
Dept: OUTPATIENT SERVICES | Facility: HOSPITAL | Age: 36
LOS: 1 days | End: 2021-10-08
Payer: SELF-PAY

## 2021-10-08 ENCOUNTER — APPOINTMENT (OUTPATIENT)
Dept: RHEUMATOLOGY | Facility: HOSPITAL | Age: 36
End: 2021-10-08

## 2021-10-08 VITALS
HEIGHT: 57.5 IN | RESPIRATION RATE: 18 BRPM | HEART RATE: 60 BPM | SYSTOLIC BLOOD PRESSURE: 107 MMHG | WEIGHT: 137 LBS | DIASTOLIC BLOOD PRESSURE: 65 MMHG | BODY MASS INDEX: 29.15 KG/M2 | TEMPERATURE: 97.8 F

## 2021-10-08 DIAGNOSIS — R06.00 DYSPNEA, UNSPECIFIED: ICD-10-CM

## 2021-10-08 DIAGNOSIS — M06.9 RHEUMATOID ARTHRITIS, UNSPECIFIED: ICD-10-CM

## 2021-10-08 LAB
HBV CORE AB SER-ACNC: SIGNIFICANT CHANGE UP
HBV CORE IGM SER-ACNC: SIGNIFICANT CHANGE UP
HBV SURFACE AB SER-ACNC: SIGNIFICANT CHANGE UP
HBV SURFACE AG SER-ACNC: SIGNIFICANT CHANGE UP

## 2021-10-08 PROCEDURE — 86480 TB TEST CELL IMMUN MEASURE: CPT

## 2021-10-08 PROCEDURE — 86704 HEP B CORE ANTIBODY TOTAL: CPT

## 2021-10-08 PROCEDURE — 86706 HEP B SURFACE ANTIBODY: CPT

## 2021-10-08 PROCEDURE — G0463: CPT

## 2021-10-08 PROCEDURE — 87340 HEPATITIS B SURFACE AG IA: CPT

## 2021-10-08 PROCEDURE — 86705 HEP B CORE ANTIBODY IGM: CPT

## 2021-10-11 LAB
GAMMA INTERFERON BACKGROUND BLD IA-ACNC: 0.06 IU/ML — SIGNIFICANT CHANGE UP
M TB IFN-G BLD-IMP: NEGATIVE — SIGNIFICANT CHANGE UP
M TB IFN-G CD4+ BCKGRND COR BLD-ACNC: 0 IU/ML — SIGNIFICANT CHANGE UP
M TB IFN-G CD4+CD8+ BCKGRND COR BLD-ACNC: -0.01 IU/ML — SIGNIFICANT CHANGE UP
QUANT TB PLUS MITOGEN MINUS NIL: 9.1 IU/ML — SIGNIFICANT CHANGE UP

## 2021-10-19 NOTE — END OF VISIT
[] : Fellow [FreeTextEntry3] : Patient seen with Dr Mcleod. New onset RA for work up and treatment decision. Low dose steroid in the meantime.

## 2021-10-19 NOTE — HISTORY OF PRESENT ILLNESS
[FreeTextEntry1] : 35 yo F referred to Rheumatology clinic due to chronic joint pain and positive serologies for autoimmune disease. Patient's joint pain started two years ago and has gotten progressively worse in a flare type pattern. Joint pain is also associated with joint swelling and is primarily in her hands, wrist and knees, occasionally shoulder too. It is bilateral usually. There is morning stiffness of greater than hour and her arthritis is improved by the end of the day after doing activities. Ibuprofen and Tylenol and have not helped. Patient is a  and does lots of manual work. She denies rashes, alopecia, or raynauds. She admits to dry eyes, dry mouth, and occasional oral ulcers. Patient endorses chronic headaches, alternating constipation and diarrhea. She admits to dyspnea on exertion and pressure like chest pain with exertion. These symptoms have been in the past year and charting documents an ED visit in May of 2021. She denies fevers, chills, recent travel, sick contacts. No family hx of autoimmune disease. She has had a tubal ligation, so no plans for further pregnancies.

## 2021-10-19 NOTE — PHYSICAL EXAM
[General Appearance - Alert] : alert [General Appearance - In No Acute Distress] : in no acute distress [Sclera] : the sclera and conjunctiva were normal [Neck Appearance] : the appearance of the neck was normal [Auscultation Breath Sounds / Voice Sounds] : lungs were clear to auscultation bilaterally [Heart Rate And Rhythm] : heart rate was normal and rhythm regular [Heart Sounds] : normal S1 and S2 [Edema] : there was no peripheral edema [Abdomen Soft] : soft [Abdomen Tenderness] : non-tender [No Spinal Tenderness] : no spinal tenderness [Abnormal Walk] : normal gait [Motor Tone] : muscle strength and tone were normal [] : no rash [Oriented To Time, Place, And Person] : oriented to person, place, and time [FreeTextEntry1] : Synovitis of the digits of the hands b/l, Synovitis of the knees b/l, 12 tender joints and 3 swollen joints

## 2021-10-19 NOTE — REASON FOR VISIT
[Initial Evaluation] : an initial evaluation [[employer] ] : provided by [employer]   [FreeTextEntry1] : Joint pain [Interpreters_IDNumber] : [id number] [Interpreters_FullName] : Hasmukh

## 2021-10-19 NOTE — ASSESSMENT
[FreeTextEntry1] : 35 yo F with PMHx of Anxiety/Depression on Lexapro presents with chronic progressively worsening polysymmetric joint pain with swelling and associated with morning stiffness that improves by the end of the day. Serologies notable for severely high CCP and RF. LIBAN positive but other Lupus serologies negative. Exam consistent with inflammatory arthritis. Xray of the hands without evidence of inflammatory or degenerative arthritis. Patient with newly diagnosed seropositive nonerosive  Rheumatoid Arthritis. Additionally, concerned with subacute symptoms of dyspnea on exertion newscasting an ILD workup though most probable etiology is anxiety in this age group.\par \par #Seropositive non-erosive RA (CDAI of 30 high severity)\par - Start Prednisone 10 mg daily for symptom control\par - Check Hepatitis B and Quantiferon\par - will discuss DMARD initiation next visit such as Methotrexate pending outcome of dyspnea workup (no further pregnancies due to tubal ligation)\par \par #Dyspnea on exertion/chest pain/ evaluate for ILD\par - referral for PFTs provided\par - referral for TTE provided\par \par RTC in 2 weeks\par \par d/w Dr. Gimenez\par

## 2021-10-22 ENCOUNTER — OUTPATIENT (OUTPATIENT)
Dept: OUTPATIENT SERVICES | Facility: HOSPITAL | Age: 36
LOS: 1 days | End: 2021-10-22
Payer: SELF-PAY

## 2021-10-22 ENCOUNTER — APPOINTMENT (OUTPATIENT)
Dept: RHEUMATOLOGY | Facility: HOSPITAL | Age: 36
End: 2021-10-22

## 2021-10-22 VITALS
TEMPERATURE: 97.8 F | HEART RATE: 70 BPM | RESPIRATION RATE: 16 BRPM | HEIGHT: 69.5 IN | DIASTOLIC BLOOD PRESSURE: 78 MMHG | SYSTOLIC BLOOD PRESSURE: 117 MMHG | WEIGHT: 137 LBS | BODY MASS INDEX: 19.83 KG/M2

## 2021-10-22 DIAGNOSIS — M06.9 RHEUMATOID ARTHRITIS, UNSPECIFIED: ICD-10-CM

## 2021-10-22 PROCEDURE — G0463: CPT

## 2021-10-22 RX ORDER — ACETAMINOPHEN 325 MG/1
325 TABLET, FILM COATED ORAL
Refills: 0 | Status: COMPLETED | COMMUNITY
End: 2021-10-22

## 2021-10-22 RX ORDER — PREDNISONE 10 MG/1
10 TABLET ORAL DAILY
Qty: 30 | Refills: 0 | Status: DISCONTINUED | COMMUNITY
Start: 2021-10-08 | End: 2021-10-22

## 2021-10-24 NOTE — HISTORY OF PRESENT ILLNESS
[FreeTextEntry1] : 35 yo F referred to Rheumatology clinic due to chronic joint pain and positive serologies for autoimmune disease. Patient's joint pain started two years ago and has gotten progressively worse in a flare type pattern. Joint pain is also associated with joint swelling and is primarily in her hands, wrist and knees, occasionally shoulder too. It is bilateral usually. There is morning stiffness of greater than hour and her arthritis is improved by the end of the day after doing activities. Ibuprofen and Tylenol and have not helped. Patient is a  and does lots of manual work. She denies rashes, alopecia, or raynauds. She admits to dry eyes, dry mouth, and occasional oral ulcers. Patient endorses chronic headaches, alternating constipation and diarrhea. She admits to dyspnea on exertion and pressure like chest pain with exertion. These symptoms have been in the past year and charting documents an ED visit in May of 2021. She denies fevers, chills, recent travel, sick contacts. No family hx of autoimmune disease. She has had a tubal ligation, so no plans for further pregnancies.\par \par 10/22/21: Patient reports improvement on Prednisone. 30% improved. Still with morning stiffness. But overall, improved. Dyspnea and chest pressure with exertion still present. She is scheduled for TTE

## 2021-10-24 NOTE — ASSESSMENT
[FreeTextEntry1] : 35 yo F with PMHx of Anxiety/Depression on Lexapro presents with chronic progressively worsening polysymmetric joint pain with swelling and associated with morning stiffness that improves by the end of the day. Serologies notable for severely high CCP and RF. LIBAN positive but other Lupus serologies negative. Exam consistent with inflammatory arthritis. Xray of the hands without evidence of inflammatory or degenerative arthritis. Patient with newly diagnosed seropositive nonerosive  Rheumatoid Arthritis. Additionally, concerned with subacute symptoms of dyspnea on exertion newscasting an ILD workup though most probable etiology is anxiety in this age group.\par \par #Seropositive non-erosive RA\par - Prednisone taper starting today (10 mg daily- decrease 2.5 mg each week)\par - Hepatitis B and Quantiferon negative Oct 2021\par - Start Methotrexate 10 mg weekly, increase 5 mg a week until at 20 mg weekly\par - Start Folic Acid 1mg daily\par - check CBC and CMP in 3 weeks\par \par \par #Dyspnea on exertion/chest pain/ evaluate for ILD\par - referral for PFTs provided\par - referral for TTE provided\par - referral for chest Xray provided\par \par RTC in 6 weeks\par \par d/w Dr. Sandhu\par

## 2021-10-24 NOTE — PHYSICAL EXAM
[General Appearance - Alert] : alert [General Appearance - In No Acute Distress] : in no acute distress [Sclera] : the sclera and conjunctiva were normal [Neck Appearance] : the appearance of the neck was normal [Auscultation Breath Sounds / Voice Sounds] : lungs were clear to auscultation bilaterally [Heart Rate And Rhythm] : heart rate was normal and rhythm regular [Heart Sounds] : normal S1 and S2 [Edema] : there was no peripheral edema [Abdomen Soft] : soft [Abdomen Tenderness] : non-tender [No Spinal Tenderness] : no spinal tenderness [Abnormal Walk] : normal gait [Motor Tone] : muscle strength and tone were normal [] : no rash [Oriented To Time, Place, And Person] : oriented to person, place, and time [FreeTextEntry1] : Synovitis of MCP and PIP joints and L wrist

## 2021-10-28 DIAGNOSIS — Z01.812 ENCOUNTER FOR PREPROCEDURAL LABORATORY EXAMINATION: ICD-10-CM

## 2021-11-11 ENCOUNTER — APPOINTMENT (OUTPATIENT)
Dept: PULMONOLOGY | Facility: CLINIC | Age: 36
End: 2021-11-11

## 2021-11-18 ENCOUNTER — APPOINTMENT (OUTPATIENT)
Dept: CV DIAGNOSITCS | Facility: HOSPITAL | Age: 36
End: 2021-11-18

## 2021-11-18 ENCOUNTER — OUTPATIENT (OUTPATIENT)
Dept: OUTPATIENT SERVICES | Facility: HOSPITAL | Age: 36
LOS: 1 days | End: 2021-11-18
Payer: SELF-PAY

## 2021-11-18 ENCOUNTER — APPOINTMENT (OUTPATIENT)
Dept: CARDIOLOGY | Facility: HOSPITAL | Age: 36
End: 2021-11-18

## 2021-11-18 ENCOUNTER — NON-APPOINTMENT (OUTPATIENT)
Age: 36
End: 2021-11-18

## 2021-11-18 VITALS
HEART RATE: 71 BPM | BODY MASS INDEX: 19.83 KG/M2 | WEIGHT: 137 LBS | OXYGEN SATURATION: 98 % | HEIGHT: 69.5 IN | SYSTOLIC BLOOD PRESSURE: 107 MMHG | DIASTOLIC BLOOD PRESSURE: 71 MMHG

## 2021-11-18 DIAGNOSIS — R07.9 CHEST PAIN, UNSPECIFIED: ICD-10-CM

## 2021-11-18 DIAGNOSIS — M06.9 RHEUMATOID ARTHRITIS, UNSPECIFIED: ICD-10-CM

## 2021-11-18 DIAGNOSIS — I25.10 ATHEROSCLEROTIC HEART DISEASE OF NATIVE CORONARY ARTERY WITHOUT ANGINA PECTORIS: ICD-10-CM

## 2021-11-18 DIAGNOSIS — E78.1 PURE HYPERGLYCERIDEMIA: ICD-10-CM

## 2021-11-18 PROCEDURE — 71046 X-RAY EXAM CHEST 2 VIEWS: CPT | Mod: 26

## 2021-11-18 PROCEDURE — G0463: CPT

## 2021-11-18 PROCEDURE — 71046 X-RAY EXAM CHEST 2 VIEWS: CPT

## 2021-11-18 PROCEDURE — 93005 ELECTROCARDIOGRAM TRACING: CPT

## 2021-11-21 PROBLEM — E78.1 HYPERTRIGLYCERIDEMIA: Status: ACTIVE | Noted: 2021-07-21

## 2021-11-21 PROBLEM — R07.9 CHEST PAIN: Status: ACTIVE | Noted: 2021-06-17

## 2021-11-21 RX ORDER — HYDROCORTISONE 25 MG/G
2.5 CREAM TOPICAL 3 TIMES DAILY
Qty: 1 | Refills: 1 | Status: DISCONTINUED | COMMUNITY
Start: 2021-08-19 | End: 2021-11-21

## 2021-11-21 RX ORDER — IBUPROFEN 400 MG/1
400 TABLET, FILM COATED ORAL 3 TIMES DAILY
Qty: 90 | Refills: 0 | Status: DISCONTINUED | COMMUNITY
Start: 2021-09-09 | End: 2021-11-21

## 2021-11-21 RX ORDER — PREDNISONE 2.5 MG/1
2.5 TABLET ORAL
Qty: 70 | Refills: 0 | Status: DISCONTINUED | COMMUNITY
Start: 2021-10-22 | End: 2021-11-21

## 2021-11-21 NOTE — PHYSICAL EXAM
[Well Developed] : well developed [Well Nourished] : well nourished [No Acute Distress] : no acute distress [Normal Conjunctiva] : normal conjunctiva [Normal Venous Pressure] : normal venous pressure [No Carotid Bruit] : no carotid bruit [Normal S1, S2] : normal S1, S2 [No Murmur] : no murmur [No Rub] : no rub [No Gallop] : no gallop [Clear Lung Fields] : clear lung fields [No Respiratory Distress] : no respiratory distress  [Good Air Entry] : good air entry [Soft] : abdomen soft [Non Tender] : non-tender [No Masses/organomegaly] : no masses/organomegaly [Normal Bowel Sounds] : normal bowel sounds [Normal Gait] : normal gait [No Edema] : no edema [No Cyanosis] : no cyanosis [No Clubbing] : no clubbing [No Varicosities] : no varicosities [No Rash] : no rash [No Skin Lesions] : no skin lesions [Moves all extremities] : moves all extremities [Normal Speech] : normal speech [No Focal Deficits] : no focal deficits [Normal memory] : normal memory [Alert and Oriented] : alert and oriented

## 2021-11-22 DIAGNOSIS — R07.9 CHEST PAIN, UNSPECIFIED: ICD-10-CM

## 2021-11-22 DIAGNOSIS — E78.1 PURE HYPERGLYCERIDEMIA: ICD-10-CM

## 2021-11-22 NOTE — HISTORY OF PRESENT ILLNESS
[FreeTextEntry1] : 37yo F h/o anxiety/depression, HTN, HLD, migraines, atypical chest pain, pelvic pain, palpitations, RA (non-erosive seropositive, followed by rheum, on MTX), first visit to cardiology clinic, here to establish care.\par Pt has been having paroxysmal chest pressure and dyspnea, unrelated to exertion, lasting 15-20 minutes, improved by drinking very cold water for years. Does not exercise regularly, however her symptoms are not reliably reproduced with exertion, happen on average 2-4x/week. ROS otherwise neg for palpitations, dizziness, syncope.

## 2021-12-03 ENCOUNTER — OUTPATIENT (OUTPATIENT)
Dept: OUTPATIENT SERVICES | Facility: HOSPITAL | Age: 36
LOS: 1 days | End: 2021-12-03
Payer: SELF-PAY

## 2021-12-03 ENCOUNTER — APPOINTMENT (OUTPATIENT)
Dept: RHEUMATOLOGY | Facility: HOSPITAL | Age: 36
End: 2021-12-03

## 2021-12-03 VITALS
RESPIRATION RATE: 14 BRPM | HEIGHT: 69.5 IN | BODY MASS INDEX: 19.98 KG/M2 | WEIGHT: 138 LBS | HEART RATE: 76 BPM | TEMPERATURE: 97.6 F | DIASTOLIC BLOOD PRESSURE: 66 MMHG | SYSTOLIC BLOOD PRESSURE: 101 MMHG

## 2021-12-03 DIAGNOSIS — M06.9 RHEUMATOID ARTHRITIS, UNSPECIFIED: ICD-10-CM

## 2021-12-03 LAB
ALBUMIN SERPL ELPH-MCNC: 4.5 G/DL — SIGNIFICANT CHANGE UP (ref 3.3–5)
ALP SERPL-CCNC: 77 U/L — SIGNIFICANT CHANGE UP (ref 40–120)
ALT FLD-CCNC: 14 U/L — SIGNIFICANT CHANGE UP (ref 10–45)
ANION GAP SERPL CALC-SCNC: 13 MMOL/L — SIGNIFICANT CHANGE UP (ref 5–17)
AST SERPL-CCNC: 13 U/L — SIGNIFICANT CHANGE UP (ref 10–40)
BASOPHILS # BLD AUTO: 0.03 K/UL — SIGNIFICANT CHANGE UP (ref 0–0.2)
BASOPHILS NFR BLD AUTO: 0.4 % — SIGNIFICANT CHANGE UP (ref 0–2)
BILIRUB SERPL-MCNC: 0.4 MG/DL — SIGNIFICANT CHANGE UP (ref 0.2–1.2)
BUN SERPL-MCNC: 15 MG/DL — SIGNIFICANT CHANGE UP (ref 7–23)
CALCIUM SERPL-MCNC: 9.2 MG/DL — SIGNIFICANT CHANGE UP (ref 8.4–10.5)
CHLORIDE SERPL-SCNC: 102 MMOL/L — SIGNIFICANT CHANGE UP (ref 96–108)
CO2 SERPL-SCNC: 23 MMOL/L — SIGNIFICANT CHANGE UP (ref 22–31)
CREAT SERPL-MCNC: 0.68 MG/DL — SIGNIFICANT CHANGE UP (ref 0.5–1.3)
EOSINOPHIL # BLD AUTO: 0.06 K/UL — SIGNIFICANT CHANGE UP (ref 0–0.5)
EOSINOPHIL NFR BLD AUTO: 0.9 % — SIGNIFICANT CHANGE UP (ref 0–6)
FERRITIN SERPL-MCNC: 24 NG/ML — SIGNIFICANT CHANGE UP (ref 15–150)
GLUCOSE SERPL-MCNC: 96 MG/DL — SIGNIFICANT CHANGE UP (ref 70–99)
HCT VFR BLD CALC: 34.8 % — SIGNIFICANT CHANGE UP (ref 34.5–45)
HGB BLD-MCNC: 11 G/DL — LOW (ref 11.5–15.5)
IMM GRANULOCYTES NFR BLD AUTO: 0.4 % — SIGNIFICANT CHANGE UP (ref 0–1.5)
IRON SATN MFR SERPL: 19 % — SIGNIFICANT CHANGE UP (ref 14–50)
IRON SATN MFR SERPL: 52 UG/DL — SIGNIFICANT CHANGE UP (ref 30–160)
LYMPHOCYTES # BLD AUTO: 0.93 K/UL — LOW (ref 1–3.3)
LYMPHOCYTES # BLD AUTO: 13.5 % — SIGNIFICANT CHANGE UP (ref 13–44)
MCHC RBC-ENTMCNC: 28.2 PG — SIGNIFICANT CHANGE UP (ref 27–34)
MCHC RBC-ENTMCNC: 31.6 GM/DL — LOW (ref 32–36)
MCV RBC AUTO: 89.2 FL — SIGNIFICANT CHANGE UP (ref 80–100)
MONOCYTES # BLD AUTO: 0.63 K/UL — SIGNIFICANT CHANGE UP (ref 0–0.9)
MONOCYTES NFR BLD AUTO: 9.1 % — SIGNIFICANT CHANGE UP (ref 2–14)
NEUTROPHILS # BLD AUTO: 5.23 K/UL — SIGNIFICANT CHANGE UP (ref 1.8–7.4)
NEUTROPHILS NFR BLD AUTO: 75.7 % — SIGNIFICANT CHANGE UP (ref 43–77)
PLATELET # BLD AUTO: 324 K/UL — SIGNIFICANT CHANGE UP (ref 150–400)
POTASSIUM SERPL-MCNC: 4.1 MMOL/L — SIGNIFICANT CHANGE UP (ref 3.5–5.3)
POTASSIUM SERPL-SCNC: 4.1 MMOL/L — SIGNIFICANT CHANGE UP (ref 3.5–5.3)
PROT SERPL-MCNC: 7 G/DL — SIGNIFICANT CHANGE UP (ref 6–8.3)
RBC # BLD: 3.9 M/UL — SIGNIFICANT CHANGE UP (ref 3.8–5.2)
RBC # FLD: 14.7 % — HIGH (ref 10.3–14.5)
SODIUM SERPL-SCNC: 138 MMOL/L — SIGNIFICANT CHANGE UP (ref 135–145)
TIBC SERPL-MCNC: 283 UG/DL — SIGNIFICANT CHANGE UP (ref 220–430)
UIBC SERPL-MCNC: 231 UG/DL — SIGNIFICANT CHANGE UP (ref 110–370)
WBC # BLD: 6.91 K/UL — SIGNIFICANT CHANGE UP (ref 3.8–10.5)
WBC # FLD AUTO: 6.91 K/UL — SIGNIFICANT CHANGE UP (ref 3.8–10.5)

## 2021-12-03 PROCEDURE — G0463: CPT

## 2021-12-03 PROCEDURE — 83540 ASSAY OF IRON: CPT

## 2021-12-03 PROCEDURE — 85025 COMPLETE CBC W/AUTO DIFF WBC: CPT

## 2021-12-03 PROCEDURE — 82728 ASSAY OF FERRITIN: CPT

## 2021-12-03 PROCEDURE — 83550 IRON BINDING TEST: CPT

## 2021-12-03 PROCEDURE — 36415 COLL VENOUS BLD VENIPUNCTURE: CPT

## 2021-12-03 PROCEDURE — 80053 COMPREHEN METABOLIC PANEL: CPT

## 2021-12-03 NOTE — ASSESSMENT
[FreeTextEntry1] : 37 yo F with PMHx of Anxiety/Depression on Lexapro presents with chronic progressively worsening polysymmetric joint pain with swelling and associated with morning stiffness that improves by the end of the day. Serologies notable for severely high CCP and RF. LIBAN positive but other Lupus serologies negative. Exam consistent with inflammatory arthritis. Xray of the hands without evidence of inflammatory or degenerative arthritis. Patient with newly diagnosed seropositive nonerosive  Rheumatoid Arthritis. Additionally, concerned with subacute symptoms of dyspnea on exertion necessitating an ILD workup though most probable etiology is anxiety in this age group.\par \par #Seropositive non-erosive RA (CDAI today 16 (7-1-5-3 - Moderate Severity)\par - no longer on Prednisone\par - increase Methotrexate from 20g weekly to 25g weekly (split the dose between morning and evening to reduce side effects). Advised patient to go back to 20 mg weekly if side effects too great\par - c/w Folic Acid 1 mg daily\par - will start the process for Humira should symptoms persist by next visit \par - CBC and CMP with no sign of Methotrexate toxicity, repeat CBC, CMP this visit (include anemia workup for chronic mild anemia)\par - Hepatitis B and Quantiferon negative Oct 2021\par \par #Dyspnea on exertion/chest pain/ evaluate for ILD\par - CXR 11/18/21 clear\par - seen by Cardiology, plan for TTE and exercise stress test \par - PFTs when able (referral provided)\par \par RTC in 6 weeks\par \par d/w Dr. Sandhu\par

## 2021-12-03 NOTE — PHYSICAL EXAM
[General Appearance - Alert] : alert [General Appearance - In No Acute Distress] : in no acute distress [Sclera] : the sclera and conjunctiva were normal [Neck Appearance] : the appearance of the neck was normal [Auscultation Breath Sounds / Voice Sounds] : lungs were clear to auscultation bilaterally [Heart Rate And Rhythm] : heart rate was normal and rhythm regular [Heart Sounds] : normal S1 and S2 [Edema] : there was no peripheral edema [Abdomen Soft] : soft [Abdomen Tenderness] : non-tender [No CVA Tenderness] : no ~M costovertebral angle tenderness [No Spinal Tenderness] : no spinal tenderness [Abnormal Walk] : normal gait [Motor Tone] : muscle strength and tone were normal [] : no rash [Oriented To Time, Place, And Person] : oriented to person, place, and time [FreeTextEntry1] : Synovitis of R wrist, L Wrist, R knee, R2-4 MCP

## 2021-12-03 NOTE — REVIEW OF SYSTEMS
[Chest Pain] : chest pain [Shortness Of Breath] : shortness of breath [Abdominal Pain] : abdominal pain [Joint Pain] : joint pain [As Noted in HPI] : as noted in HPI [Negative] : Psychiatric [Fever] : no fever [Eye Pain] : no eye pain

## 2021-12-03 NOTE — HISTORY OF PRESENT ILLNESS
[FreeTextEntry1] : 35 yo F referred to Rheumatology clinic due to chronic joint pain and positive serologies for autoimmune disease. Patient's joint pain started two years ago and has gotten progressively worse in a flare type pattern. Joint pain is also associated with joint swelling and is primarily in her hands, wrist and knees, occasionally shoulder too. It is bilateral usually. There is morning stiffness of greater than hour and her arthritis is improved by the end of the day after doing activities. Ibuprofen and Tylenol and have not helped. Patient is a  and does lots of manual work. She denies rashes, alopecia, or raynauds. She admits to dry eyes, dry mouth, and occasional oral ulcers. Patient endorses chronic headaches, alternating constipation and diarrhea. She admits to dyspnea on exertion and pressure like chest pain with exertion. These symptoms have been in the past year and charting documents an ED visit in May of 2021. She denies fevers, chills, recent travel, sick contacts. No family hx of autoimmune disease. She has had a tubal ligation, so no plans for further pregnancies.\par \par 10/22/21: Patient reports improvement on Prednisone. 30% improved. Still with morning stiffness. But overall, improved. Dyspnea and chest pressure with exertion still present. \par \par 12/3/21: Patient reports overall improvement, but has joint pain in her right wrist, left wrist and right knee. The pain came on after Prednisone taper was discontinued. She is compliant with Methotrexate 20 mg weekly. On the day she takes it, she gets diarrhea, headache and dizziness. She followed up with a Cardiologist for her dyspnea and chest pain (still present and can be at rest). The plan is to have TTE and exercise stress test. On ROS, she admits to mild abdominal/flank pain that is worse with movement.\par \par

## 2021-12-10 RX ORDER — ADALIMUMAB 40MG/0.4ML
40 KIT SUBCUTANEOUS
Qty: 2 | Refills: 2 | Status: DISCONTINUED | COMMUNITY
Start: 2021-12-10 | End: 2021-12-10

## 2022-01-07 ENCOUNTER — OUTPATIENT (OUTPATIENT)
Dept: OUTPATIENT SERVICES | Facility: HOSPITAL | Age: 37
LOS: 1 days | End: 2022-01-07
Payer: SELF-PAY

## 2022-01-07 ENCOUNTER — APPOINTMENT (OUTPATIENT)
Dept: RHEUMATOLOGY | Facility: HOSPITAL | Age: 37
End: 2022-01-07

## 2022-01-07 VITALS
HEART RATE: 66 BPM | WEIGHT: 140 LBS | BODY MASS INDEX: 20.27 KG/M2 | TEMPERATURE: 96.1 F | DIASTOLIC BLOOD PRESSURE: 65 MMHG | SYSTOLIC BLOOD PRESSURE: 106 MMHG | HEIGHT: 69.5 IN

## 2022-01-07 DIAGNOSIS — M06.9 RHEUMATOID ARTHRITIS, UNSPECIFIED: ICD-10-CM

## 2022-01-07 LAB
ALBUMIN SERPL ELPH-MCNC: 4.5 G/DL — SIGNIFICANT CHANGE UP (ref 3.3–5)
ALP SERPL-CCNC: 82 U/L — SIGNIFICANT CHANGE UP (ref 40–120)
ALT FLD-CCNC: 28 U/L — SIGNIFICANT CHANGE UP (ref 10–45)
ANION GAP SERPL CALC-SCNC: 14 MMOL/L — SIGNIFICANT CHANGE UP (ref 5–17)
AST SERPL-CCNC: 17 U/L — SIGNIFICANT CHANGE UP (ref 10–40)
BASOPHILS # BLD AUTO: 0.01 K/UL — SIGNIFICANT CHANGE UP (ref 0–0.2)
BASOPHILS NFR BLD AUTO: 0.1 % — SIGNIFICANT CHANGE UP (ref 0–2)
BILIRUB SERPL-MCNC: 0.3 MG/DL — SIGNIFICANT CHANGE UP (ref 0.2–1.2)
BUN SERPL-MCNC: 23 MG/DL — SIGNIFICANT CHANGE UP (ref 7–23)
CALCIUM SERPL-MCNC: 9.3 MG/DL — SIGNIFICANT CHANGE UP (ref 8.4–10.5)
CHLORIDE SERPL-SCNC: 104 MMOL/L — SIGNIFICANT CHANGE UP (ref 96–108)
CO2 SERPL-SCNC: 22 MMOL/L — SIGNIFICANT CHANGE UP (ref 22–31)
COVID-19 SPIKE DOMAIN AB INTERP: POSITIVE
COVID-19 SPIKE DOMAIN ANTIBODY RESULT: >250 U/ML — HIGH
CREAT SERPL-MCNC: 0.69 MG/DL — SIGNIFICANT CHANGE UP (ref 0.5–1.3)
CRP SERPL-MCNC: 3 MG/L — SIGNIFICANT CHANGE UP
EOSINOPHIL # BLD AUTO: 0 K/UL — SIGNIFICANT CHANGE UP (ref 0–0.5)
EOSINOPHIL NFR BLD AUTO: 0 % — SIGNIFICANT CHANGE UP (ref 0–6)
ERYTHROCYTE [SEDIMENTATION RATE] IN BLOOD: 41 MM/HR — HIGH (ref 0–15)
GLUCOSE SERPL-MCNC: 123 MG/DL — HIGH (ref 70–99)
HCT VFR BLD CALC: 38 % — SIGNIFICANT CHANGE UP (ref 34.5–45)
HGB BLD-MCNC: 12.2 G/DL — SIGNIFICANT CHANGE UP (ref 11.5–15.5)
IMM GRANULOCYTES NFR BLD AUTO: 0.3 % — SIGNIFICANT CHANGE UP (ref 0–1.5)
LYMPHOCYTES # BLD AUTO: 0.83 K/UL — LOW (ref 1–3.3)
LYMPHOCYTES # BLD AUTO: 8.8 % — LOW (ref 13–44)
MCHC RBC-ENTMCNC: 29 PG — SIGNIFICANT CHANGE UP (ref 27–34)
MCHC RBC-ENTMCNC: 32.1 GM/DL — SIGNIFICANT CHANGE UP (ref 32–36)
MCV RBC AUTO: 90.5 FL — SIGNIFICANT CHANGE UP (ref 80–100)
MONOCYTES # BLD AUTO: 0.4 K/UL — SIGNIFICANT CHANGE UP (ref 0–0.9)
MONOCYTES NFR BLD AUTO: 4.2 % — SIGNIFICANT CHANGE UP (ref 2–14)
NEUTROPHILS # BLD AUTO: 8.15 K/UL — HIGH (ref 1.8–7.4)
NEUTROPHILS NFR BLD AUTO: 86.6 % — HIGH (ref 43–77)
PLATELET # BLD AUTO: 388 K/UL — SIGNIFICANT CHANGE UP (ref 150–400)
POTASSIUM SERPL-MCNC: 4.7 MMOL/L — SIGNIFICANT CHANGE UP (ref 3.5–5.3)
POTASSIUM SERPL-SCNC: 4.7 MMOL/L — SIGNIFICANT CHANGE UP (ref 3.5–5.3)
PROT SERPL-MCNC: 7.3 G/DL — SIGNIFICANT CHANGE UP (ref 6–8.3)
RBC # BLD: 4.2 M/UL — SIGNIFICANT CHANGE UP (ref 3.8–5.2)
RBC # FLD: 14.8 % — HIGH (ref 10.3–14.5)
SARS-COV-2 IGG+IGM SERPL QL IA: >250 U/ML — HIGH
SARS-COV-2 IGG+IGM SERPL QL IA: POSITIVE
SODIUM SERPL-SCNC: 140 MMOL/L — SIGNIFICANT CHANGE UP (ref 135–145)
WBC # BLD: 9.42 K/UL — SIGNIFICANT CHANGE UP (ref 3.8–10.5)
WBC # FLD AUTO: 9.42 K/UL — SIGNIFICANT CHANGE UP (ref 3.8–10.5)

## 2022-01-07 PROCEDURE — 85025 COMPLETE CBC W/AUTO DIFF WBC: CPT

## 2022-01-07 PROCEDURE — 86769 SARS-COV-2 COVID-19 ANTIBODY: CPT

## 2022-01-07 PROCEDURE — 86140 C-REACTIVE PROTEIN: CPT

## 2022-01-07 PROCEDURE — G0463: CPT

## 2022-01-07 PROCEDURE — 85652 RBC SED RATE AUTOMATED: CPT

## 2022-01-07 PROCEDURE — 80053 COMPREHEN METABOLIC PANEL: CPT

## 2022-01-07 PROCEDURE — 36415 COLL VENOUS BLD VENIPUNCTURE: CPT

## 2022-01-07 NOTE — PHYSICAL EXAM
[General Appearance - Alert] : alert [General Appearance - In No Acute Distress] : in no acute distress [Sclera] : the sclera and conjunctiva were normal [Neck Appearance] : the appearance of the neck was normal [Auscultation Breath Sounds / Voice Sounds] : lungs were clear to auscultation bilaterally [Heart Rate And Rhythm] : heart rate was normal and rhythm regular [Heart Sounds] : normal S1 and S2 [Edema] : there was no peripheral edema [Abdomen Soft] : soft [Abdomen Tenderness] : non-tender [No CVA Tenderness] : no ~M costovertebral angle tenderness [No Spinal Tenderness] : no spinal tenderness [Abnormal Walk] : normal gait [Motor Tone] : muscle strength and tone were normal [] : no rash [Oriented To Time, Place, And Person] : oriented to person, place, and time [FreeTextEntry1] : Slight tenderness of R and L wrist. No effusions present.

## 2022-01-07 NOTE — HISTORY OF PRESENT ILLNESS
[FreeTextEntry1] : 37 yo F referred to Rheumatology clinic due to chronic joint pain and positive serologies for autoimmune disease. Patient's joint pain started two years ago and has gotten progressively worse in a flare type pattern. Joint pain is also associated with joint swelling and is primarily in her hands, wrist and knees, occasionally shoulder too. It is bilateral usually. There is morning stiffness of greater than hour and her arthritis is improved by the end of the day after doing activities. Ibuprofen and Tylenol and have not helped. Patient is a  and does lots of manual work. She denies rashes, alopecia, or raynauds. She admits to dry eyes, dry mouth, and occasional oral ulcers. Patient endorses chronic headaches, alternating constipation and diarrhea. She admits to dyspnea on exertion and pressure like chest pain with exertion. These symptoms have been in the past year and charting documents an ED visit in May of 2021. She denies fevers, chills, recent travel, sick contacts. No family hx of autoimmune disease. She has had a tubal ligation, so no plans for further pregnancies.\par \par 10/22/21: Patient reports improvement on Prednisone. 30% improved. Still with morning stiffness. But overall, improved. Dyspnea and chest pressure with exertion still present. \par \par 12/3/21: Patient reports overall improvement, but has joint pain in her right wrist, left wrist and right knee. The pain came on after Prednisone taper was discontinued. She is compliant with Methotrexate 20 mg weekly. On the day she takes it, she gets diarrhea, headache and dizziness. She followed up with a Cardiologist for her dyspnea and chest pain (still present and can be at rest). The plan is to have TTE and exercise stress test. On ROS, she admits to mild abdominal/flank pain that is worse with movement.\par \par 1/7/22: Patient's joint symptoms are improved. She is compliant on Methotrexate 25 mg weekly and tolerating it. Joint pain is now limited to b/l wrists only. She denies morning stiffness. She rates her own Global Assessment as a 4 out of 10 in relation to her Rheumatid Arthritis. Patient also reports that her chest pain and dyspnea have resolved. She denies fevers, chills, cough, abdominal pain, n/v/d. She denies rashes, myalgias. \par \par

## 2022-01-07 NOTE — REASON FOR VISIT
[Follow-Up: _____] : a [unfilled] follow-up visit [Interpreters_IDNumber] : [id number] [TWNoteComboBox1] : [place]ican

## 2022-01-07 NOTE — ASSESSMENT
[FreeTextEntry1] : 37 yo F with PMHx of Anxiety/Depression on Lexapro presents with chronic progressively worsening polysymmetric joint pain with swelling and associated with morning stiffness that improves by the end of the day. Serologies notable for severely high CCP and RF. LIBAN positive but other Lupus serologies negative. Initial exam consistent with inflammatory arthritis. Xray of the hands without evidence of inflammatory or degenerative arthritis. Patient with seropositive nonerosive Rheumatoid Arthritis that is currently well controlled on Methotrexate. Chest pain and dyspnea have resolved.\par \par #Seropositive non-erosive RA (CDAI today 8 (4-2-2-0 - Low Severity)\par - c/w Methotrexate 25g weekly (split the dose between morning and evening).\par - c/w Folic Acid 1 mg daily\par - hold off on Humira initiation\par - check CBC, CMP, ESR, CRP, COVID spike antibody\par - Hepatitis B and Quantiferon negative Oct 2021\par \par #Dyspnea on exertion/chest pain/ evaluate for ILD -> Symptoms now resolved\par - CXR 11/18/21 clear\par - seen by Cardiology, plan for TTE and exercise stress test this month\par \par RTC in 6-8 weeks\par \par d/w Dr. Sandhu\par

## 2022-01-07 NOTE — REVIEW OF SYSTEMS
[Joint Pain] : joint pain [Negative] : Psychiatric [Fever] : no fever [Eye Pain] : no eye pain [Chest Pain] : no chest pain [Shortness Of Breath] : no shortness of breath [Abdominal Pain] : no abdominal pain

## 2022-02-17 ENCOUNTER — APPOINTMENT (OUTPATIENT)
Dept: CV DIAGNOSTICS | Facility: HOSPITAL | Age: 37
End: 2022-02-17
Payer: COMMERCIAL

## 2022-02-17 ENCOUNTER — APPOINTMENT (OUTPATIENT)
Dept: CV DIAGNOSITCS | Facility: HOSPITAL | Age: 37
End: 2022-02-17
Payer: COMMERCIAL

## 2022-02-17 ENCOUNTER — OUTPATIENT (OUTPATIENT)
Dept: OUTPATIENT SERVICES | Facility: HOSPITAL | Age: 37
LOS: 1 days | End: 2022-02-17
Payer: SELF-PAY

## 2022-02-17 DIAGNOSIS — R07.9 CHEST PAIN, UNSPECIFIED: ICD-10-CM

## 2022-02-17 DIAGNOSIS — E78.1 PURE HYPERGLYCERIDEMIA: ICD-10-CM

## 2022-02-17 PROCEDURE — 93016 CV STRESS TEST SUPVJ ONLY: CPT

## 2022-02-17 PROCEDURE — 93018 CV STRESS TEST I&R ONLY: CPT

## 2022-02-17 PROCEDURE — 93306 TTE W/DOPPLER COMPLETE: CPT | Mod: 26

## 2022-02-17 PROCEDURE — 93306 TTE W/DOPPLER COMPLETE: CPT

## 2022-02-17 PROCEDURE — 93017 CV STRESS TEST TRACING ONLY: CPT

## 2022-04-29 ENCOUNTER — OUTPATIENT (OUTPATIENT)
Dept: OUTPATIENT SERVICES | Facility: HOSPITAL | Age: 37
LOS: 1 days | End: 2022-04-29
Payer: SELF-PAY

## 2022-04-29 ENCOUNTER — APPOINTMENT (OUTPATIENT)
Dept: RHEUMATOLOGY | Facility: HOSPITAL | Age: 37
End: 2022-04-29
Payer: SELF-PAY

## 2022-04-29 VITALS
HEIGHT: 60 IN | BODY MASS INDEX: 26.7 KG/M2 | SYSTOLIC BLOOD PRESSURE: 107 MMHG | TEMPERATURE: 97.1 F | DIASTOLIC BLOOD PRESSURE: 65 MMHG | WEIGHT: 136 LBS | HEART RATE: 94 BPM

## 2022-04-29 DIAGNOSIS — M06.9 RHEUMATOID ARTHRITIS, UNSPECIFIED: ICD-10-CM

## 2022-04-29 LAB
ALBUMIN SERPL ELPH-MCNC: 4.7 G/DL — SIGNIFICANT CHANGE UP (ref 3.3–5)
ALP SERPL-CCNC: 123 U/L — HIGH (ref 40–120)
ALT FLD-CCNC: 19 U/L — SIGNIFICANT CHANGE UP (ref 10–45)
ANION GAP SERPL CALC-SCNC: 13 MMOL/L — SIGNIFICANT CHANGE UP (ref 5–17)
AST SERPL-CCNC: 12 U/L — SIGNIFICANT CHANGE UP (ref 10–40)
BASOPHILS # BLD AUTO: 0.02 K/UL — SIGNIFICANT CHANGE UP (ref 0–0.2)
BASOPHILS NFR BLD AUTO: 0.2 % — SIGNIFICANT CHANGE UP (ref 0–2)
BILIRUB SERPL-MCNC: 0.4 MG/DL — SIGNIFICANT CHANGE UP (ref 0.2–1.2)
BUN SERPL-MCNC: 9 MG/DL — SIGNIFICANT CHANGE UP (ref 7–23)
CALCIUM SERPL-MCNC: 9.2 MG/DL — SIGNIFICANT CHANGE UP (ref 8.4–10.5)
CHLORIDE SERPL-SCNC: 103 MMOL/L — SIGNIFICANT CHANGE UP (ref 96–108)
CO2 SERPL-SCNC: 26 MMOL/L — SIGNIFICANT CHANGE UP (ref 22–31)
CREAT SERPL-MCNC: 0.6 MG/DL — SIGNIFICANT CHANGE UP (ref 0.5–1.3)
CRP SERPL-MCNC: 5 MG/L — HIGH
EGFR: 120 ML/MIN/1.73M2 — SIGNIFICANT CHANGE UP
EOSINOPHIL # BLD AUTO: 0.06 K/UL — SIGNIFICANT CHANGE UP (ref 0–0.5)
EOSINOPHIL NFR BLD AUTO: 0.6 % — SIGNIFICANT CHANGE UP (ref 0–6)
ERYTHROCYTE [SEDIMENTATION RATE] IN BLOOD: 26 MM/HR — HIGH (ref 0–15)
GLUCOSE SERPL-MCNC: 100 MG/DL — HIGH (ref 70–99)
HCT VFR BLD CALC: 35.9 % — SIGNIFICANT CHANGE UP (ref 34.5–45)
HGB BLD-MCNC: 11.5 G/DL — SIGNIFICANT CHANGE UP (ref 11.5–15.5)
IMM GRANULOCYTES NFR BLD AUTO: 0.5 % — SIGNIFICANT CHANGE UP (ref 0–1.5)
LYMPHOCYTES # BLD AUTO: 1.07 K/UL — SIGNIFICANT CHANGE UP (ref 1–3.3)
LYMPHOCYTES # BLD AUTO: 11.4 % — LOW (ref 13–44)
MCHC RBC-ENTMCNC: 29.6 PG — SIGNIFICANT CHANGE UP (ref 27–34)
MCHC RBC-ENTMCNC: 32 GM/DL — SIGNIFICANT CHANGE UP (ref 32–36)
MCV RBC AUTO: 92.5 FL — SIGNIFICANT CHANGE UP (ref 80–100)
MONOCYTES # BLD AUTO: 0.98 K/UL — HIGH (ref 0–0.9)
MONOCYTES NFR BLD AUTO: 10.4 % — SIGNIFICANT CHANGE UP (ref 2–14)
NEUTROPHILS # BLD AUTO: 7.2 K/UL — SIGNIFICANT CHANGE UP (ref 1.8–7.4)
NEUTROPHILS NFR BLD AUTO: 76.9 % — SIGNIFICANT CHANGE UP (ref 43–77)
PLATELET # BLD AUTO: 316 K/UL — SIGNIFICANT CHANGE UP (ref 150–400)
POTASSIUM SERPL-MCNC: 3.8 MMOL/L — SIGNIFICANT CHANGE UP (ref 3.5–5.3)
POTASSIUM SERPL-SCNC: 3.8 MMOL/L — SIGNIFICANT CHANGE UP (ref 3.5–5.3)
PROT SERPL-MCNC: 7 G/DL — SIGNIFICANT CHANGE UP (ref 6–8.3)
RBC # BLD: 3.88 M/UL — SIGNIFICANT CHANGE UP (ref 3.8–5.2)
RBC # FLD: 15 % — HIGH (ref 10.3–14.5)
SODIUM SERPL-SCNC: 141 MMOL/L — SIGNIFICANT CHANGE UP (ref 135–145)
WBC # BLD: 9.38 K/UL — SIGNIFICANT CHANGE UP (ref 3.8–10.5)
WBC # FLD AUTO: 9.38 K/UL — SIGNIFICANT CHANGE UP (ref 3.8–10.5)

## 2022-04-29 PROCEDURE — 80053 COMPREHEN METABOLIC PANEL: CPT

## 2022-04-29 PROCEDURE — 86140 C-REACTIVE PROTEIN: CPT

## 2022-04-29 PROCEDURE — ZZZZZ: CPT | Mod: GC

## 2022-04-29 PROCEDURE — 85652 RBC SED RATE AUTOMATED: CPT

## 2022-04-29 PROCEDURE — G0463: CPT

## 2022-04-29 PROCEDURE — 85025 COMPLETE CBC W/AUTO DIFF WBC: CPT

## 2022-04-29 RX ORDER — ESCITALOPRAM OXALATE 10 MG/1
10 TABLET ORAL DAILY
Qty: 90 | Refills: 1 | Status: DISCONTINUED | COMMUNITY
Start: 2021-08-19 | End: 2022-04-29

## 2022-05-06 NOTE — ASSESSMENT
[FreeTextEntry1] : 36 yo F with PMHx of Anxiety/Depression on Lexapro presents with chronic progressively worsening polyarthritic symmetric joint pain with swelling and associated with morning stiffness that improves by the end of the day. Serologies notable for high CCP and RF. LIBAN positive but other Lupus serologies negative. Initial exam consistent with inflammatory arthritis. Xray of the hands without evidence of inflammatory or degenerative arthritis. Patient with seropositive nonerosive Rheumatoid Arthritis that is currently well controlled on Methotrexate. Chest pain and dyspnea have resolved.\par \par #Seropositive non-erosive RA (CDAI today 9.5 low severity)\par - c/w Methotrexate 25g weekly (split the dose between morning and evening).\par - monitor for further Methotrexate intolerance (had one vomiting episode)\par - c/w Folic Acid 1 mg daily\par - hold off on Humira initiation\par - check CBC, CMP, ESR, CRP\par - obtain US right wrist to evaluation for inflammation\par - PT for Right shoulder\par - Voltaren gel prn for right shoulder and right wrist\par - instructed patient to check temperature when feeling feverish\par - Hepatitis B and Quantiferon negative Oct 2021\par \par #Dyspnea on exertion/chest pain/ evaluate for ILD -> Symptoms now resolved\par - CXR 11/18/21 clear\par - Exercise stress teset negative and normal TTE Feb 2022\par \par #HCM\par - will discuss during next visit\par \par RTC in 2 months\par \par d/w Dr. Sandhu\par

## 2022-05-06 NOTE — HISTORY OF PRESENT ILLNESS
[FreeTextEntry1] : 37 yo F referred to Rheumatology clinic due to chronic joint pain and positive serologies for autoimmune disease. Patient's joint pain started two years ago and has gotten progressively worse in a flare type pattern. Joint pain is also associated with joint swelling and is primarily in her hands, wrist and knees, occasionally shoulder too. It is bilateral usually. There is morning stiffness of greater than hour and her arthritis is improved by the end of the day after doing activities. Ibuprofen and Tylenol and have not helped. Patient is a  and does lots of manual work. She denies rashes, alopecia, or raynauds. She admits to dry eyes, dry mouth, and occasional oral ulcers. Patient endorses chronic headaches, alternating constipation and diarrhea. She admits to dyspnea on exertion and pressure like chest pain with exertion. These symptoms have been in the past year and charting documents an ED visit in May of 2021. She denies fevers, chills, recent travel, sick contacts. No family hx of autoimmune disease. She has had a tubal ligation, so no plans for further pregnancies.\par \par 10/22/21: Patient reports improvement on Prednisone. 30% improved. Still with morning stiffness. But overall, improved. Dyspnea and chest pressure with exertion still present. \par \par 12/3/21: Patient reports overall improvement, but has joint pain in her right wrist, left wrist and right knee. The pain came on after Prednisone taper was discontinued. She is compliant with Methotrexate 20 mg weekly. On the day she takes it, she gets diarrhea, headache and dizziness. She followed up with a Cardiologist for her dyspnea and chest pain (still present and can be at rest). The plan is to have TTE and exercise stress test. On ROS, she admits to mild abdominal/flank pain that is worse with movement.\par \par 1/7/22: Patient's joint symptoms are improved. She is compliant on Methotrexate 25 mg weekly and tolerating it. Joint pain is now limited to b/l wrists only. She denies morning stiffness. She rates her own Global Assessment as a 4 out of 10 in relation to her Rheumatid Arthritis. Patient also reports that her chest pain and dyspnea have resolved. She denies fevers, chills, cough, abdominal pain, n/v/d. She denies rashes, myalgias. \par \par 4/29/22: She reports continued right wrist pain since last visit. Two weeks ago she developed right shoulder/neck pain. All other joints without pain. She endorses subjective fevers during some nights over the last two weeks that resolved with Tylenol. Did not measure her temperature. She is compliant with MTX. Yesterday, was the first time she vomited from it. Normally, she has some minor indigestion. No chest pain, shortness of breath, rashes.

## 2022-05-06 NOTE — REVIEW OF SYSTEMS
[As Noted in HPI] : as noted in HPI [Joint Pain] : joint pain [Negative] : Psychiatric [Eye Pain] : no eye pain [Chest Pain] : no chest pain [Shortness Of Breath] : no shortness of breath [Joint Swelling] : no joint swelling [FreeTextEntry2] : Subjective fevers

## 2022-05-06 NOTE — PHYSICAL EXAM
[General Appearance - Alert] : alert [General Appearance - In No Acute Distress] : in no acute distress [Sclera] : the sclera and conjunctiva were normal [Neck Appearance] : the appearance of the neck was normal [Auscultation Breath Sounds / Voice Sounds] : lungs were clear to auscultation bilaterally [Heart Rate And Rhythm] : heart rate was normal and rhythm regular [Heart Sounds] : normal S1 and S2 [Edema] : there was no peripheral edema [Abdomen Soft] : soft [Abdomen Tenderness] : non-tender [No CVA Tenderness] : no ~M costovertebral angle tenderness [No Spinal Tenderness] : no spinal tenderness [Abnormal Walk] : normal gait [Musculoskeletal - Swelling] : no joint swelling seen [Motor Tone] : muscle strength and tone were normal [] : no rash [Oriented To Time, Place, And Person] : oriented to person, place, and time [FreeTextEntry1] : Slight tenderness of R wrist and R shoulder. Full ROM in shoulders b/l

## 2022-05-06 NOTE — REASON FOR VISIT
[Follow-Up: _____] : a [unfilled] follow-up visit [Interpreters_IDNumber] : [id number] [TWNoteComboBox1] : [place]

## 2022-05-16 ENCOUNTER — NON-APPOINTMENT (OUTPATIENT)
Age: 37
End: 2022-05-16

## 2022-05-19 ENCOUNTER — APPOINTMENT (OUTPATIENT)
Dept: ULTRASOUND IMAGING | Facility: CLINIC | Age: 37
End: 2022-05-19

## 2022-05-25 ENCOUNTER — NON-APPOINTMENT (OUTPATIENT)
Age: 37
End: 2022-05-25

## 2022-06-24 ENCOUNTER — OUTPATIENT (OUTPATIENT)
Dept: OUTPATIENT SERVICES | Facility: HOSPITAL | Age: 37
LOS: 1 days | End: 2022-06-24
Payer: SELF-PAY

## 2022-06-24 ENCOUNTER — APPOINTMENT (OUTPATIENT)
Dept: RHEUMATOLOGY | Facility: HOSPITAL | Age: 37
End: 2022-06-24

## 2022-06-24 VITALS
RESPIRATION RATE: 14 BRPM | TEMPERATURE: 98 F | SYSTOLIC BLOOD PRESSURE: 110 MMHG | HEART RATE: 77 BPM | BODY MASS INDEX: 29.34 KG/M2 | WEIGHT: 136 LBS | DIASTOLIC BLOOD PRESSURE: 71 MMHG | HEIGHT: 57 IN

## 2022-06-24 DIAGNOSIS — M06.9 RHEUMATOID ARTHRITIS, UNSPECIFIED: ICD-10-CM

## 2022-06-24 LAB
ALBUMIN SERPL ELPH-MCNC: 4.8 G/DL — SIGNIFICANT CHANGE UP (ref 3.3–5)
ALP SERPL-CCNC: 97 U/L — SIGNIFICANT CHANGE UP (ref 40–120)
ALT FLD-CCNC: 31 U/L — SIGNIFICANT CHANGE UP (ref 10–45)
ANION GAP SERPL CALC-SCNC: 11 MMOL/L — SIGNIFICANT CHANGE UP (ref 5–17)
AST SERPL-CCNC: 16 U/L — SIGNIFICANT CHANGE UP (ref 10–40)
BASOPHILS # BLD AUTO: 0.01 K/UL — SIGNIFICANT CHANGE UP (ref 0–0.2)
BASOPHILS NFR BLD AUTO: 0.1 % — SIGNIFICANT CHANGE UP (ref 0–2)
BILIRUB SERPL-MCNC: 0.8 MG/DL — SIGNIFICANT CHANGE UP (ref 0.2–1.2)
BUN SERPL-MCNC: 13 MG/DL — SIGNIFICANT CHANGE UP (ref 7–23)
CALCIUM SERPL-MCNC: 9.6 MG/DL — SIGNIFICANT CHANGE UP (ref 8.4–10.5)
CHLORIDE SERPL-SCNC: 104 MMOL/L — SIGNIFICANT CHANGE UP (ref 96–108)
CO2 SERPL-SCNC: 26 MMOL/L — SIGNIFICANT CHANGE UP (ref 22–31)
CREAT SERPL-MCNC: 0.6 MG/DL — SIGNIFICANT CHANGE UP (ref 0.5–1.3)
EGFR: 120 ML/MIN/1.73M2 — SIGNIFICANT CHANGE UP
EOSINOPHIL # BLD AUTO: 0.05 K/UL — SIGNIFICANT CHANGE UP (ref 0–0.5)
EOSINOPHIL NFR BLD AUTO: 0.7 % — SIGNIFICANT CHANGE UP (ref 0–6)
GLUCOSE SERPL-MCNC: 104 MG/DL — HIGH (ref 70–99)
HCT VFR BLD CALC: 36.3 % — SIGNIFICANT CHANGE UP (ref 34.5–45)
HGB BLD-MCNC: 11.5 G/DL — SIGNIFICANT CHANGE UP (ref 11.5–15.5)
IMM GRANULOCYTES NFR BLD AUTO: 0.4 % — SIGNIFICANT CHANGE UP (ref 0–1.5)
LYMPHOCYTES # BLD AUTO: 1.19 K/UL — SIGNIFICANT CHANGE UP (ref 1–3.3)
LYMPHOCYTES # BLD AUTO: 15.7 % — SIGNIFICANT CHANGE UP (ref 13–44)
MCHC RBC-ENTMCNC: 29 PG — SIGNIFICANT CHANGE UP (ref 27–34)
MCHC RBC-ENTMCNC: 31.7 GM/DL — LOW (ref 32–36)
MCV RBC AUTO: 91.4 FL — SIGNIFICANT CHANGE UP (ref 80–100)
MONOCYTES # BLD AUTO: 0.67 K/UL — SIGNIFICANT CHANGE UP (ref 0–0.9)
MONOCYTES NFR BLD AUTO: 8.8 % — SIGNIFICANT CHANGE UP (ref 2–14)
NEUTROPHILS # BLD AUTO: 5.65 K/UL — SIGNIFICANT CHANGE UP (ref 1.8–7.4)
NEUTROPHILS NFR BLD AUTO: 74.3 % — SIGNIFICANT CHANGE UP (ref 43–77)
PLATELET # BLD AUTO: 353 K/UL — SIGNIFICANT CHANGE UP (ref 150–400)
POTASSIUM SERPL-MCNC: 4.5 MMOL/L — SIGNIFICANT CHANGE UP (ref 3.5–5.3)
POTASSIUM SERPL-SCNC: 4.5 MMOL/L — SIGNIFICANT CHANGE UP (ref 3.5–5.3)
PROT SERPL-MCNC: 7.6 G/DL — SIGNIFICANT CHANGE UP (ref 6–8.3)
RBC # BLD: 3.97 M/UL — SIGNIFICANT CHANGE UP (ref 3.8–5.2)
RBC # FLD: 15.6 % — HIGH (ref 10.3–14.5)
SODIUM SERPL-SCNC: 141 MMOL/L — SIGNIFICANT CHANGE UP (ref 135–145)
WBC # BLD: 7.6 K/UL — SIGNIFICANT CHANGE UP (ref 3.8–10.5)
WBC # FLD AUTO: 7.6 K/UL — SIGNIFICANT CHANGE UP (ref 3.8–10.5)

## 2022-06-24 PROCEDURE — ZZZZZ: CPT | Mod: GC

## 2022-06-24 PROCEDURE — G0463: CPT

## 2022-06-24 PROCEDURE — 85025 COMPLETE CBC W/AUTO DIFF WBC: CPT

## 2022-06-24 PROCEDURE — 80053 COMPREHEN METABOLIC PANEL: CPT

## 2022-06-24 RX ORDER — FOLIC ACID 1 MG/1
1 TABLET ORAL
Qty: 30 | Refills: 2 | Status: ACTIVE | COMMUNITY
Start: 2021-10-22 | End: 1900-01-01

## 2022-06-24 NOTE — PHYSICAL EXAM
[General Appearance - Alert] : alert [General Appearance - In No Acute Distress] : in no acute distress [Sclera] : the sclera and conjunctiva were normal [Neck Appearance] : the appearance of the neck was normal [Auscultation Breath Sounds / Voice Sounds] : lungs were clear to auscultation bilaterally [Heart Rate And Rhythm] : heart rate was normal and rhythm regular [Heart Sounds] : normal S1 and S2 [Edema] : there was no peripheral edema [Abdomen Soft] : soft [Abdomen Tenderness] : non-tender [No CVA Tenderness] : no ~M costovertebral angle tenderness [No Spinal Tenderness] : no spinal tenderness [Abnormal Walk] : normal gait [Musculoskeletal - Swelling] : no joint swelling seen [Motor Tone] : muscle strength and tone were normal [] : no rash [Oriented To Time, Place, And Person] : oriented to person, place, and time [FreeTextEntry1] : Tenderness of lateral R wrist

## 2022-06-24 NOTE — HISTORY OF PRESENT ILLNESS
[FreeTextEntry1] : Reports continued R wrist pain. Two weeks ago her R wrist swelled on the lateral side. She was unable to do the US or wrist. She was unable to start PT for her R shoulder. The voltaren gel helped. She denies joint pain anywhere else. She is able to work as . Denies fevers, chills, chest pain, shortness of breath. Denies rashes, alopecia, dry eyes, dry mouth, Raynauds, oral ulcers.

## 2022-06-24 NOTE — ASSESSMENT
[FreeTextEntry1] : 38 yo F with PMHx of Anxiety/Depression on Lexapro presents for f/u for seropositive non-erosive RA. Continues to have significant R wrist pain.\par \par #Seropositive non-erosive RA (CDAI today 9 low severity)\par - Start Humira [id number] \par - Risks of Humira and what to watch out for discussed at length with patient\par - decrease Methotrexate 25mg weekly to 20mg weekly\par - c/w Folic Acid 1 mg daily\par - check CBC, CMP this visit\par - PT for Right shoulder if continues to have pain\par - Voltaren gel prn for right shoulder and right wrist\par - Hepatitis B and Quantiferon negative Oct 2021\par \par #Dyspnea on exertion/chest pain/ evaluate for ILD -> Symptoms now resolved\par - CXR 11/18/21 clear\par - Exercise stress teset negative and normal TTE Feb 2022\par \par RTC in 6 weeks\par \par d/w Dr. Sandhu\par

## 2022-06-24 NOTE — REVIEW OF SYSTEMS
[As Noted in HPI] : as noted in HPI [Joint Pain] : joint pain [Abdominal Pain] : abdominal pain [Joint Swelling] : joint swelling [Negative] : Respiratory [Fever] : no fever [Chills] : no chills [Eye Pain] : no eye pain [Chest Pain] : no chest pain [Shortness Of Breath] : no shortness of breath [FreeTextEntry7] : Mild

## 2022-06-28 ENCOUNTER — NON-APPOINTMENT (OUTPATIENT)
Age: 37
End: 2022-06-28

## 2022-08-19 ENCOUNTER — OUTPATIENT (OUTPATIENT)
Dept: OUTPATIENT SERVICES | Facility: HOSPITAL | Age: 37
LOS: 1 days | End: 2022-08-19
Payer: SELF-PAY

## 2022-08-19 ENCOUNTER — APPOINTMENT (OUTPATIENT)
Dept: RHEUMATOLOGY | Facility: HOSPITAL | Age: 37
End: 2022-08-19

## 2022-08-19 VITALS
SYSTOLIC BLOOD PRESSURE: 116 MMHG | BODY MASS INDEX: 29.12 KG/M2 | DIASTOLIC BLOOD PRESSURE: 75 MMHG | WEIGHT: 135 LBS | TEMPERATURE: 98 F | RESPIRATION RATE: 14 BRPM | HEIGHT: 57 IN | HEART RATE: 61 BPM

## 2022-08-19 DIAGNOSIS — M06.9 RHEUMATOID ARTHRITIS, UNSPECIFIED: ICD-10-CM

## 2022-08-19 DIAGNOSIS — B35.1 TINEA UNGUIUM: ICD-10-CM

## 2022-08-19 LAB
A1C WITH ESTIMATED AVERAGE GLUCOSE RESULT: 5.7 % — HIGH (ref 4–5.6)
ALBUMIN SERPL ELPH-MCNC: 4.4 G/DL — SIGNIFICANT CHANGE UP (ref 3.3–5)
ALP SERPL-CCNC: 83 U/L — SIGNIFICANT CHANGE UP (ref 40–120)
ALT FLD-CCNC: 13 U/L — SIGNIFICANT CHANGE UP (ref 10–45)
ANION GAP SERPL CALC-SCNC: 10 MMOL/L — SIGNIFICANT CHANGE UP (ref 5–17)
AST SERPL-CCNC: 14 U/L — SIGNIFICANT CHANGE UP (ref 10–40)
BASOPHILS # BLD AUTO: 0.02 K/UL — SIGNIFICANT CHANGE UP (ref 0–0.2)
BASOPHILS NFR BLD AUTO: 0.3 % — SIGNIFICANT CHANGE UP (ref 0–2)
BILIRUB SERPL-MCNC: 0.4 MG/DL — SIGNIFICANT CHANGE UP (ref 0.2–1.2)
BUN SERPL-MCNC: 13 MG/DL — SIGNIFICANT CHANGE UP (ref 7–23)
CALCIUM SERPL-MCNC: 9.5 MG/DL — SIGNIFICANT CHANGE UP (ref 8.4–10.5)
CHLORIDE SERPL-SCNC: 105 MMOL/L — SIGNIFICANT CHANGE UP (ref 96–108)
CO2 SERPL-SCNC: 25 MMOL/L — SIGNIFICANT CHANGE UP (ref 22–31)
CREAT SERPL-MCNC: 0.69 MG/DL — SIGNIFICANT CHANGE UP (ref 0.5–1.3)
CRP SERPL-MCNC: <3 MG/L — SIGNIFICANT CHANGE UP
EGFR: 116 ML/MIN/1.73M2 — SIGNIFICANT CHANGE UP
EOSINOPHIL # BLD AUTO: 0.05 K/UL — SIGNIFICANT CHANGE UP (ref 0–0.5)
EOSINOPHIL NFR BLD AUTO: 0.8 % — SIGNIFICANT CHANGE UP (ref 0–6)
ERYTHROCYTE [SEDIMENTATION RATE] IN BLOOD: 32 MM/HR — HIGH (ref 0–15)
ESTIMATED AVERAGE GLUCOSE: 117 MG/DL — HIGH (ref 68–114)
GLUCOSE SERPL-MCNC: 98 MG/DL — SIGNIFICANT CHANGE UP (ref 70–99)
HCT VFR BLD CALC: 35.5 % — SIGNIFICANT CHANGE UP (ref 34.5–45)
HGB BLD-MCNC: 11.4 G/DL — LOW (ref 11.5–15.5)
IMM GRANULOCYTES NFR BLD AUTO: 0.3 % — SIGNIFICANT CHANGE UP (ref 0–1.5)
LYMPHOCYTES # BLD AUTO: 1.4 K/UL — SIGNIFICANT CHANGE UP (ref 1–3.3)
LYMPHOCYTES # BLD AUTO: 23.8 % — SIGNIFICANT CHANGE UP (ref 13–44)
MCHC RBC-ENTMCNC: 29.3 PG — SIGNIFICANT CHANGE UP (ref 27–34)
MCHC RBC-ENTMCNC: 32.1 GM/DL — SIGNIFICANT CHANGE UP (ref 32–36)
MCV RBC AUTO: 91.3 FL — SIGNIFICANT CHANGE UP (ref 80–100)
MONOCYTES # BLD AUTO: 0.53 K/UL — SIGNIFICANT CHANGE UP (ref 0–0.9)
MONOCYTES NFR BLD AUTO: 9 % — SIGNIFICANT CHANGE UP (ref 2–14)
NEUTROPHILS # BLD AUTO: 3.87 K/UL — SIGNIFICANT CHANGE UP (ref 1.8–7.4)
NEUTROPHILS NFR BLD AUTO: 65.8 % — SIGNIFICANT CHANGE UP (ref 43–77)
PLATELET # BLD AUTO: 332 K/UL — SIGNIFICANT CHANGE UP (ref 150–400)
POTASSIUM SERPL-MCNC: 4.1 MMOL/L — SIGNIFICANT CHANGE UP (ref 3.5–5.3)
POTASSIUM SERPL-SCNC: 4.1 MMOL/L — SIGNIFICANT CHANGE UP (ref 3.5–5.3)
PROT SERPL-MCNC: 7.3 G/DL — SIGNIFICANT CHANGE UP (ref 6–8.3)
RBC # BLD: 3.89 M/UL — SIGNIFICANT CHANGE UP (ref 3.8–5.2)
RBC # FLD: 15.1 % — HIGH (ref 10.3–14.5)
SODIUM SERPL-SCNC: 140 MMOL/L — SIGNIFICANT CHANGE UP (ref 135–145)
T4 FREE+ TSH PNL SERPL: 2.84 UIU/ML — SIGNIFICANT CHANGE UP (ref 0.27–4.2)
WBC # BLD: 5.89 K/UL — SIGNIFICANT CHANGE UP (ref 3.8–10.5)
WBC # FLD AUTO: 5.89 K/UL — SIGNIFICANT CHANGE UP (ref 3.8–10.5)

## 2022-08-19 PROCEDURE — 83036 HEMOGLOBIN GLYCOSYLATED A1C: CPT

## 2022-08-19 PROCEDURE — 80053 COMPREHEN METABOLIC PANEL: CPT

## 2022-08-19 PROCEDURE — 85025 COMPLETE CBC W/AUTO DIFF WBC: CPT

## 2022-08-19 PROCEDURE — 85652 RBC SED RATE AUTOMATED: CPT

## 2022-08-19 PROCEDURE — G0463: CPT

## 2022-08-19 PROCEDURE — 84443 ASSAY THYROID STIM HORMONE: CPT

## 2022-08-19 PROCEDURE — 86140 C-REACTIVE PROTEIN: CPT

## 2022-08-29 NOTE — PHYSICAL EXAM
[General Appearance - Alert] : alert [General Appearance - In No Acute Distress] : in no acute distress [Sclera] : the sclera and conjunctiva were normal [Neck Appearance] : the appearance of the neck was normal [Auscultation Breath Sounds / Voice Sounds] : lungs were clear to auscultation bilaterally [Heart Rate And Rhythm] : heart rate was normal and rhythm regular [Heart Sounds] : normal S1 and S2 [Edema] : there was no peripheral edema [Abdomen Soft] : soft [Abdomen Tenderness] : non-tender [No Spinal Tenderness] : no spinal tenderness [Abnormal Walk] : normal gait [Musculoskeletal - Swelling] : no joint swelling seen [Motor Tone] : muscle strength and tone were normal [] : no rash [Cranial Nerves] : cranial nerves 2-12 were intact [Deep Tendon Reflexes (DTR)] : deep tendon reflexes were 2+ and symmetric [Sensation] : the sensory exam was normal to light touch and pinprick [Motor Exam] : the motor exam was normal [Oriented To Time, Place, And Person] : oriented to person, place, and time [FreeTextEntry1] : Big toe nail suggestive of  Onychomycosis

## 2022-08-29 NOTE — REASON FOR VISIT
[Follow-Up: _____] : a [unfilled] follow-up visit [FreeTextEntry1] : Rheumatoid Arthritis [Interpreters_IDNumber] : [id number] [TWNoteComboBox1] : [place]

## 2022-08-29 NOTE — ASSESSMENT
[FreeTextEntry1] : 36 yo F with PMHx of Anxiety/Depression on Lexapro presents for f/u for seropositive non-erosive RA. \par \par #Seropositive non-erosive RA (CDAI today 9 low severity)\par - c/w Humira [id number] \par - decrease Methotrexate 20mg weekly to 15mg weekly\par - c/w Folic Acid 1 mg daily\par - check CBC, CMP, ESR, CRP this visit\par - Voltaren gel prn\par - Hepatitis B and Quantiferon negative Oct 2021\par \par #episodic lightheadedness vs. vertigo; (component of anxiety?)\par - check a1c, TSH\par - monitor for now\par \par #Onychomycosis (foot nail)\par - start Ciclopirox solution\par \par #Dyspnea on exertion/chest pain/ evaluate for ILD -> Symptoms now resolved\par - CXR 11/18/21 clear\par - Exercise stress teset negative and normal TTE Feb 2022\par \par RTC in 5 weeks\par \par d/w Dr. Gimenez\par

## 2022-08-29 NOTE — HISTORY OF PRESENT ILLNESS
[FreeTextEntry1] : Reports no joint pain or morning stiffness today. She does admit that she has b/l shoulder pain 2 weeks ago that lasted for 4 days. She is able to work as . Admits to two episodes where she felt like the room was spinning and weak knees while standing. She did not syncopize.  Denies fevers, chills, chest pain, shortness of breath. Denies rashes, alopecia, dry eyes, dry mouth, Raynauds, oral ulcers. Admits to diarrhea on day taking MTX.

## 2022-08-31 DIAGNOSIS — B35.1 TINEA UNGUIUM: ICD-10-CM

## 2022-09-15 ENCOUNTER — OUTPATIENT (OUTPATIENT)
Dept: OUTPATIENT SERVICES | Facility: HOSPITAL | Age: 37
LOS: 1 days | End: 2022-09-15
Payer: SELF-PAY

## 2022-09-15 ENCOUNTER — APPOINTMENT (OUTPATIENT)
Dept: OBGYN | Facility: CLINIC | Age: 37
End: 2022-09-15

## 2022-09-15 VITALS
DIASTOLIC BLOOD PRESSURE: 76 MMHG | SYSTOLIC BLOOD PRESSURE: 110 MMHG | TEMPERATURE: 97.6 F | HEIGHT: 57 IN | HEART RATE: 65 BPM | BODY MASS INDEX: 29.12 KG/M2 | OXYGEN SATURATION: 100 % | WEIGHT: 135 LBS | RESPIRATION RATE: 18 BRPM

## 2022-09-15 DIAGNOSIS — Z00.00 ENCOUNTER FOR GENERAL ADULT MEDICAL EXAMINATION WITHOUT ABNORMAL FINDINGS: ICD-10-CM

## 2022-09-15 PROCEDURE — G0463: CPT

## 2022-09-15 PROCEDURE — 99213 OFFICE O/P EST LOW 20 MIN: CPT

## 2022-09-15 NOTE — HISTORY OF PRESENT ILLNESS
[FreeTextEntry1] : CC: annual checkup, also c/o irreg periods since July\par HPI:  7/26; 8/12;/9/5 x 4days each, heavy. Recently was dx'ed w/ rheumatoid arthritis, currently taking methotrexate and folic acid. Very stressed- Kid in [facility] is sick.\par \par UPT neg\par Pap 7/2021 wnl, hpv neg.\par Hct 8/2022=35\par TSH nl\par  [TextBox_4] : Patient is here for an annual exam and she also states that her mentrual cycle has been irregular for the last 3 months  [PapSmeardate] : 07/22/21 [TextBox_31] : Negative  [HPVDate] : 07/22/21 [TextBox_78] : Negative

## 2022-09-15 NOTE — PHYSICAL EXAM
[None] : had no significant interval events [Heartburn] : denies heartburn [Nausea] : denies nausea [Vomiting] : denies vomiting [Diarrhea] : denies diarrhea [Appropriately responsive] : appropriately responsive [Constipation] : denies constipation [Alert] : alert [Yellow Skin Or Eyes (Jaundice)] : denies jaundice [Abdominal Pain] : denies abdominal pain [No Acute Distress] : no acute distress [Abdominal Swelling] : denies abdominal swelling [No Lymphadenopathy] : no lymphadenopathy [No Murmurs] : no murmurs [Rectal Pain] : denies rectal pain [Cholelithiasis] : cholelithiasis [Soft] : soft [Abdominal Surgery] : abdominal surgery [Non-tender] : non-tender [Cholecystectomy] : cholecystectomy [Non-distended] : non-distended [No HSM] : No HSM [Wt Gain ___ Lbs] : no recent weight gain [No Lesions] : no lesions [Wt Loss ___ Lbs] : no recent weight loss [GERD] : no gastroesophageal reflux disease [No Mass] : no mass [Hiatus Hernia] : no hiatus hernia [Oriented x3] : oriented x3 [Examination Of The Breasts] : a normal appearance [Peptic Ulcer Disease] : no peptic ulcer disease [No Masses] : no breast masses were palpable [Pancreatitis] : no pancreatitis [Kidney Stone] : no kidney stone [Labia Majora] : normal [Inflammatory Bowel Disease] : no inflammatory bowel disease [Labia Minora] : normal [Irritable Bowel Syndrome] : no irritable bowel syndrome [Normal] : normal [Diverticulitis] : no diverticulitis [Uterine Adnexae] : normal [Alcohol Abuse] : no alcohol abuse [Malignancy] : no malignancy [Appendectomy] : no appendectomy [de-identified] : this is the patient's first visit here [de-identified] : Patient presents today for initial evaluation for PEG tube placement. 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 He is not yet begun any treatment for this which includes radiation and chemotherapy and does complain of dysphagia for solids. This will likely worsen with radiation therapy and therefore PEG tube placement is requested. [de-identified] : dysphagia

## 2022-09-16 DIAGNOSIS — N92.6 IRREGULAR MENSTRUATION, UNSPECIFIED: ICD-10-CM

## 2022-09-23 ENCOUNTER — APPOINTMENT (OUTPATIENT)
Dept: ULTRASOUND IMAGING | Facility: HOSPITAL | Age: 37
End: 2022-09-23
Payer: MEDICAID

## 2022-09-23 ENCOUNTER — OUTPATIENT (OUTPATIENT)
Dept: OUTPATIENT SERVICES | Facility: HOSPITAL | Age: 37
LOS: 1 days | End: 2022-09-23
Payer: SELF-PAY

## 2022-09-23 ENCOUNTER — APPOINTMENT (OUTPATIENT)
Dept: RHEUMATOLOGY | Facility: HOSPITAL | Age: 37
End: 2022-09-23

## 2022-09-23 VITALS
BODY MASS INDEX: 29.12 KG/M2 | SYSTOLIC BLOOD PRESSURE: 105 MMHG | TEMPERATURE: 97.7 F | HEART RATE: 67 BPM | RESPIRATION RATE: 18 BRPM | WEIGHT: 135 LBS | HEIGHT: 57 IN | DIASTOLIC BLOOD PRESSURE: 70 MMHG

## 2022-09-23 DIAGNOSIS — M06.9 RHEUMATOID ARTHRITIS, UNSPECIFIED: ICD-10-CM

## 2022-09-23 DIAGNOSIS — N92.6 IRREGULAR MENSTRUATION, UNSPECIFIED: ICD-10-CM

## 2022-09-23 PROCEDURE — 76830 TRANSVAGINAL US NON-OB: CPT

## 2022-09-23 PROCEDURE — G0463: CPT

## 2022-09-23 PROCEDURE — G0008: CPT

## 2022-09-23 PROCEDURE — 76830 TRANSVAGINAL US NON-OB: CPT | Mod: 26

## 2022-09-23 PROCEDURE — 76856 US EXAM PELVIC COMPLETE: CPT | Mod: 26

## 2022-09-23 PROCEDURE — 76856 US EXAM PELVIC COMPLETE: CPT

## 2022-09-23 PROCEDURE — ZZZZZ: CPT | Mod: GC

## 2022-09-23 PROCEDURE — 90688 IIV4 VACCINE SPLT 0.5 ML IM: CPT

## 2022-09-27 NOTE — REVIEW OF SYSTEMS
[As Noted in HPI] : as noted in HPI [Dizziness] : dizziness [Negative] : Gastrointestinal [Fever] : no fever [Chills] : no chills [Eye Pain] : no eye pain [Chest Pain] : no chest pain [Shortness Of Breath] : no shortness of breath

## 2022-09-27 NOTE — ASSESSMENT
[FreeTextEntry1] : 38 yo F with PMHx of Anxiety/Depression on Lexapro presents for f/u for seropositive non-erosive RA. \par \par #Seropositive non-erosive RA (CDAI today 7 (previously 9))\par - c/w Humira [id number] \par - c/w Methotrexate 15mg weekly\par - c/w Folic Acid 1 mg daily\par - check CBC, CMP, ESR, CRP next visit\par - Voltaren gel prn\par - Hepatitis B and Quantiferon negative Oct 2021 (recheck next visit)\par \par #episodic lightheadedness vs. vertigo; (component of anxiety?)\par = Resolved\par \par #Onychomycosis (foot nail)\par - c/w Ciclopirox solution\par \par #Dyspnea on exertion/chest pain/ evaluate for ILD -> Symptoms now resolved\par - CXR 11/18/21 clear\par - Exercise stress teset negative and normal TTE Feb 2022\par \par #Health Maintenance\par - Flu shot administered this visit\par - next visit discuss Pneumococcal vaccine\par \par RTC in 3 months\par \par d/w Dr. Salinas\par

## 2022-09-27 NOTE — REASON FOR VISIT
[Follow-Up: _____] : a [unfilled] follow-up visit [FreeTextEntry1] : Rheumatoid Arthritis [Interpreters_IDNumber] : [id number] [TWNoteComboBox1] : [place]

## 2022-09-27 NOTE — HISTORY OF PRESENT ILLNESS
[FreeTextEntry1] : Feels well today. Only joint complaint is minor R shoulder pain. Denies morning stiffness and joint swelling. She is able to work as . Episodes of dizziness have resolved.\par Feels fatigue for past week. Denies fevers, chills, chest pain, shortness of breath. Denies rashes, alopecia, dry eyes, dry mouth, Raynauds, oral ulcers.

## 2022-09-29 ENCOUNTER — OUTPATIENT (OUTPATIENT)
Dept: OUTPATIENT SERVICES | Facility: HOSPITAL | Age: 37
LOS: 1 days | End: 2022-09-29
Payer: SELF-PAY

## 2022-09-29 ENCOUNTER — APPOINTMENT (OUTPATIENT)
Dept: OBGYN | Facility: CLINIC | Age: 37
End: 2022-09-29

## 2022-09-29 VITALS
OXYGEN SATURATION: 100 % | SYSTOLIC BLOOD PRESSURE: 108 MMHG | DIASTOLIC BLOOD PRESSURE: 72 MMHG | BODY MASS INDEX: 29.34 KG/M2 | TEMPERATURE: 97.6 F | WEIGHT: 136 LBS | HEIGHT: 57 IN | RESPIRATION RATE: 18 BRPM | HEART RATE: 73 BPM

## 2022-09-29 DIAGNOSIS — Z00.00 ENCOUNTER FOR GENERAL ADULT MEDICAL EXAMINATION WITHOUT ABNORMAL FINDINGS: ICD-10-CM

## 2022-09-29 DIAGNOSIS — N92.6 IRREGULAR MENSTRUATION, UNSPECIFIED: ICD-10-CM

## 2022-09-29 PROCEDURE — G0463: CPT

## 2022-09-29 PROCEDURE — 99212 OFFICE O/P EST SF 10 MIN: CPT

## 2022-09-29 NOTE — HISTORY OF PRESENT ILLNESS
[FreeTextEntry1] : Here to discuss pelvic sono result\par H/o irreg periods x 2mo.\par PElvic sono wnl. Em 10mm, \par  [TextBox_4] : Pt is here to go over ultrasound results  [PapSmeardate] : 07/22/21 [TextBox_31] : Normal  [HPVDate] : 07/22/21 [TextBox_78] : Negative

## 2022-09-30 DIAGNOSIS — N92.6 IRREGULAR MENSTRUATION, UNSPECIFIED: ICD-10-CM

## 2022-12-16 ENCOUNTER — APPOINTMENT (OUTPATIENT)
Dept: RHEUMATOLOGY | Facility: HOSPITAL | Age: 37
End: 2022-12-16

## 2022-12-16 ENCOUNTER — NON-APPOINTMENT (OUTPATIENT)
Age: 37
End: 2022-12-16

## 2022-12-16 RX ORDER — DICLOFENAC SODIUM 1% 10 MG/G
1 GEL TOPICAL TWICE DAILY
Qty: 1 | Refills: 3 | Status: ACTIVE | COMMUNITY
Start: 2022-04-29 | End: 1900-01-01

## 2022-12-16 RX ORDER — METHOTREXATE 2.5 MG/1
2.5 TABLET ORAL
Qty: 24 | Refills: 1 | Status: ACTIVE | COMMUNITY
Start: 2021-10-22 | End: 1900-01-01

## 2022-12-22 RX ORDER — ADALIMUMAB 40MG/0.4ML
40 KIT SUBCUTANEOUS
Qty: 2 | Refills: 2 | Status: ACTIVE | COMMUNITY
Start: 2021-12-10

## 2023-02-17 ENCOUNTER — APPOINTMENT (OUTPATIENT)
Dept: RHEUMATOLOGY | Facility: HOSPITAL | Age: 38
End: 2023-02-17
Payer: COMMERCIAL

## 2023-02-17 ENCOUNTER — OUTPATIENT (OUTPATIENT)
Dept: OUTPATIENT SERVICES | Facility: HOSPITAL | Age: 38
LOS: 1 days | End: 2023-02-17
Payer: SELF-PAY

## 2023-02-17 DIAGNOSIS — M06.9 RHEUMATOID ARTHRITIS, UNSPECIFIED: ICD-10-CM

## 2023-02-17 PROCEDURE — ZZZZZ: CPT | Mod: GC

## 2023-02-17 PROCEDURE — G0463: CPT

## 2023-02-17 RX ORDER — CICLOPIROX 80 MG/ML
8 SOLUTION TOPICAL
Qty: 1 | Refills: 1 | Status: DISCONTINUED | COMMUNITY
Start: 2022-08-19 | End: 2023-02-17

## 2023-02-20 NOTE — REVIEW OF SYSTEMS
[As Noted in HPI] : as noted in HPI [Negative] : Neurological [Fever] : no fever [Chills] : no chills [Eye Pain] : no eye pain [Chest Pain] : no chest pain [Shortness Of Breath] : no shortness of breath

## 2023-02-20 NOTE — HISTORY OF PRESENT ILLNESS
[FreeTextEntry1] : Missed her last appointment due to URI. Also was out of Humira for 6 weeks. Restarted end of December. In early January, developed pain in R shoulder for days, followed by pain in L elbow for days, followed by pain in knees that lasted for days. Today, she denies morning stiffness and joint swelling. She has pain only in her left elbow.  Denies fevers, chills, chest pain, shortness of breath. Denies rashes, alopecia, dry eyes, dry mouth, Raynauds, oral ulcers. \par \par She has new insurance that is not accepted by us and will need a new Rheumatologist.

## 2023-02-20 NOTE — ASSESSMENT
[FreeTextEntry1] : 39 yo F with PMHx of Anxiety/Depression on Lexapro presents for f/u for seropositive non-erosive RA. \par \par #Seropositive non-erosive RA (CDAI today 8 (previously 7))\par - c/w Humira [id number] \par - c/w Methotrexate 15mg weekly\par - c/w Folic Acid 1 mg daily\par - check CBC, CMP, ESR, CRP next visit\par - Voltaren gel prn\par - Hepatitis B and Quantiferon negative Oct 2021 (recheck now)\par \par #Transition to new Rheumatologist (due to new insurance)\par - provided list of Rheumatologist that accept her insurance and instructed to call today so that her Humira can be continued into the future\par - provided a list of labs that she can do the above lab work with her new insurance\par \par #Onychomycosis (foot nail)\par - c/w Ciclopirox solution\par \par #Dyspnea on exertion/chest pain/ evaluate for ILD -> Symptoms now resolved\par - CXR 11/18/21 clear\par - Exercise stress teset negative and normal TTE Feb 2022\par \par #Health Maintenance\par - Flu shot administered 2022\par \par \par d/w Dr. Sandhu\par

## 2023-02-20 NOTE — REASON FOR VISIT
[Follow-Up: _____] : a [unfilled] follow-up visit [FreeTextEntry1] : Rheumatoid Arthritis [Interpreters_IDNumber] : [id number] [TWNoteComboBox1] : [place]

## 2023-02-28 ENCOUNTER — APPOINTMENT (OUTPATIENT)
Dept: INTERNAL MEDICINE | Facility: CLINIC | Age: 38
End: 2023-02-28

## 2024-05-20 ENCOUNTER — EMERGENCY (EMERGENCY)
Facility: HOSPITAL | Age: 39
LOS: 1 days | Discharge: HOME | End: 2024-05-20
Attending: EMERGENCY MEDICINE
Payer: MEDICAID

## 2024-05-20 VITALS
OXYGEN SATURATION: 97 % | WEIGHT: 145.06 LBS | TEMPERATURE: 98 F | SYSTOLIC BLOOD PRESSURE: 123 MMHG | HEART RATE: 73 BPM | RESPIRATION RATE: 16 BRPM | DIASTOLIC BLOOD PRESSURE: 73 MMHG

## 2024-05-20 PROCEDURE — 99283 EMERGENCY DEPT VISIT LOW MDM: CPT

## 2024-05-20 RX ORDER — IBUPROFEN 200 MG
600 TABLET ORAL ONCE
Refills: 0 | Status: COMPLETED | OUTPATIENT
Start: 2024-05-20 | End: 2024-05-20

## 2024-05-20 RX ORDER — DEXAMETHASONE 0.5 MG/5ML
10 ELIXIR ORAL ONCE
Refills: 0 | Status: COMPLETED | OUTPATIENT
Start: 2024-05-20 | End: 2024-05-20

## 2024-05-20 RX ADMIN — Medication 10 MILLIGRAM(S): at 17:58

## 2024-05-20 RX ADMIN — Medication 600 MILLIGRAM(S): at 17:58

## 2024-05-20 NOTE — ED PROVIDER NOTE - CLINICAL SUMMARY MEDICAL DECISION MAKING FREE TEXT BOX
39 yr old female with hx of arthritis presents to ed c/o b/l wrist pain (worse at night), myalgia, foot pain, fatigue x 2 weeks. pt works as cleaning lady. no direct trauma, no fever, no rash. tried tylenol w/o relieve. no dysuria.    wrist pain possibly from carpal tunnel syndrome. take motrin 600mg every 6 hrs as needed, tylenol 650mg every 4 hrs as needed, stay active, no heavy lifting.  body pains- no physical cause. obtain adequate rest and do not overwork. see primary care doctor for routine physical.  Return if symptoms worsens

## 2024-05-20 NOTE — ED PROVIDER NOTE - NSFOLLOWUPINSTRUCTIONS_ED_ALL_ED_FT
wrist pain possibly from carpal tunnel syndrome. take motrin 600mg every 6 hrs as needed, tylenol 650mg every 4 hrs as needed, stay active, no heavy lifting.  body pains- no physical cause. obtain adequate rest and do not overwork. see primary care doctor for routine physical.  Return if symptoms worsens    Dolor en la janice posiblemente debido al síndrome del túnel carpiano. tome motrin 600 mg cada 6 horas según sea necesario, tylenol 650 mg cada 4 horas según sea necesario, manténgase activo, no levante objetos pesados.  Mansi corporales, sin causa física. Obtenga un descanso adecuado y no trabaje demasiado. consulte a pugh médico de atención primaria para un examen físico de rutina.  Regresar si los síntomas empeoran

## 2024-06-02 ENCOUNTER — EMERGENCY (EMERGENCY)
Facility: HOSPITAL | Age: 39
LOS: 1 days | Discharge: HOME | End: 2024-06-02
Attending: STUDENT IN AN ORGANIZED HEALTH CARE EDUCATION/TRAINING PROGRAM
Payer: MEDICAID

## 2024-06-02 VITALS
DIASTOLIC BLOOD PRESSURE: 75 MMHG | SYSTOLIC BLOOD PRESSURE: 145 MMHG | HEIGHT: 59 IN | OXYGEN SATURATION: 99 % | HEART RATE: 97 BPM | WEIGHT: 139.99 LBS | TEMPERATURE: 100 F | RESPIRATION RATE: 16 BRPM

## 2024-06-02 VITALS
HEART RATE: 84 BPM | RESPIRATION RATE: 18 BRPM | DIASTOLIC BLOOD PRESSURE: 68 MMHG | SYSTOLIC BLOOD PRESSURE: 106 MMHG | TEMPERATURE: 100 F | OXYGEN SATURATION: 99 %

## 2024-06-02 LAB
ALBUMIN SERPL ELPH-MCNC: 3.1 G/DL — LOW (ref 3.5–5)
ALP SERPL-CCNC: 92 U/L — SIGNIFICANT CHANGE UP (ref 40–120)
ALT FLD-CCNC: 31 U/L DA — SIGNIFICANT CHANGE UP (ref 10–60)
ANION GAP SERPL CALC-SCNC: 3 MMOL/L — LOW (ref 5–17)
APPEARANCE UR: CLEAR — SIGNIFICANT CHANGE UP
AST SERPL-CCNC: 12 U/L — SIGNIFICANT CHANGE UP (ref 10–40)
BACTERIA # UR AUTO: ABNORMAL /HPF
BASOPHILS # BLD AUTO: 0.02 K/UL — SIGNIFICANT CHANGE UP (ref 0–0.2)
BASOPHILS NFR BLD AUTO: 0.2 % — SIGNIFICANT CHANGE UP (ref 0–2)
BILIRUB SERPL-MCNC: 0.3 MG/DL — SIGNIFICANT CHANGE UP (ref 0.2–1.2)
BILIRUB UR-MCNC: NEGATIVE — SIGNIFICANT CHANGE UP
BUN SERPL-MCNC: 12 MG/DL — SIGNIFICANT CHANGE UP (ref 7–18)
CALCIUM SERPL-MCNC: 8.1 MG/DL — LOW (ref 8.4–10.5)
CHLORIDE SERPL-SCNC: 112 MMOL/L — HIGH (ref 96–108)
CO2 SERPL-SCNC: 26 MMOL/L — SIGNIFICANT CHANGE UP (ref 22–31)
COLOR SPEC: YELLOW — SIGNIFICANT CHANGE UP
CREAT SERPL-MCNC: 0.8 MG/DL — SIGNIFICANT CHANGE UP (ref 0.5–1.3)
DIFF PNL FLD: ABNORMAL
EGFR: 97 ML/MIN/1.73M2 — SIGNIFICANT CHANGE UP
EOSINOPHIL # BLD AUTO: 0.09 K/UL — SIGNIFICANT CHANGE UP (ref 0–0.5)
EOSINOPHIL NFR BLD AUTO: 1 % — SIGNIFICANT CHANGE UP (ref 0–6)
EPI CELLS # UR: PRESENT
FLUAV AG NPH QL: SIGNIFICANT CHANGE UP
FLUBV AG NPH QL: SIGNIFICANT CHANGE UP
GLUCOSE SERPL-MCNC: 98 MG/DL — SIGNIFICANT CHANGE UP (ref 70–99)
GLUCOSE UR QL: NEGATIVE MG/DL — SIGNIFICANT CHANGE UP
HCG UR QL: NEGATIVE — SIGNIFICANT CHANGE UP
HCT VFR BLD CALC: 29.9 % — LOW (ref 34.5–45)
HGB BLD-MCNC: 9.4 G/DL — LOW (ref 11.5–15.5)
IMM GRANULOCYTES NFR BLD AUTO: 0.4 % — SIGNIFICANT CHANGE UP (ref 0–0.9)
KETONES UR-MCNC: NEGATIVE MG/DL — SIGNIFICANT CHANGE UP
LEUKOCYTE ESTERASE UR-ACNC: ABNORMAL
LIDOCAIN IGE QN: 49 U/L — SIGNIFICANT CHANGE UP (ref 13–75)
LYMPHOCYTES # BLD AUTO: 1.04 K/UL — SIGNIFICANT CHANGE UP (ref 1–3.3)
LYMPHOCYTES # BLD AUTO: 11.6 % — LOW (ref 13–44)
MCHC RBC-ENTMCNC: 26.8 PG — LOW (ref 27–34)
MCHC RBC-ENTMCNC: 31.4 GM/DL — LOW (ref 32–36)
MCV RBC AUTO: 85.2 FL — SIGNIFICANT CHANGE UP (ref 80–100)
MONOCYTES # BLD AUTO: 0.9 K/UL — SIGNIFICANT CHANGE UP (ref 0–0.9)
MONOCYTES NFR BLD AUTO: 10 % — SIGNIFICANT CHANGE UP (ref 2–14)
NEUTROPHILS # BLD AUTO: 6.89 K/UL — SIGNIFICANT CHANGE UP (ref 1.8–7.4)
NEUTROPHILS NFR BLD AUTO: 76.8 % — SIGNIFICANT CHANGE UP (ref 43–77)
NITRITE UR-MCNC: NEGATIVE — SIGNIFICANT CHANGE UP
NRBC # BLD: 0 /100 WBCS — SIGNIFICANT CHANGE UP (ref 0–0)
PH UR: 6 — SIGNIFICANT CHANGE UP (ref 5–8)
PLATELET # BLD AUTO: 314 K/UL — SIGNIFICANT CHANGE UP (ref 150–400)
POTASSIUM SERPL-MCNC: 3.8 MMOL/L — SIGNIFICANT CHANGE UP (ref 3.5–5.3)
POTASSIUM SERPL-SCNC: 3.8 MMOL/L — SIGNIFICANT CHANGE UP (ref 3.5–5.3)
PROT SERPL-MCNC: 6.6 G/DL — SIGNIFICANT CHANGE UP (ref 6–8.3)
PROT UR-MCNC: NEGATIVE MG/DL — SIGNIFICANT CHANGE UP
RBC # BLD: 3.51 M/UL — LOW (ref 3.8–5.2)
RBC # FLD: 15.9 % — HIGH (ref 10.3–14.5)
RBC CASTS # UR COMP ASSIST: 2 /HPF — SIGNIFICANT CHANGE UP (ref 0–4)
SARS-COV-2 RNA SPEC QL NAA+PROBE: SIGNIFICANT CHANGE UP
SODIUM SERPL-SCNC: 141 MMOL/L — SIGNIFICANT CHANGE UP (ref 135–145)
SP GR SPEC: 1.01 — SIGNIFICANT CHANGE UP (ref 1–1.03)
UROBILINOGEN FLD QL: 0.2 MG/DL — SIGNIFICANT CHANGE UP (ref 0.2–1)
WBC # BLD: 8.98 K/UL — SIGNIFICANT CHANGE UP (ref 3.8–10.5)
WBC # FLD AUTO: 8.98 K/UL — SIGNIFICANT CHANGE UP (ref 3.8–10.5)
WBC UR QL: 4 /HPF — SIGNIFICANT CHANGE UP (ref 0–5)

## 2024-06-02 PROCEDURE — 74177 CT ABD & PELVIS W/CONTRAST: CPT | Mod: 26,MC

## 2024-06-02 PROCEDURE — 87086 URINE CULTURE/COLONY COUNT: CPT

## 2024-06-02 PROCEDURE — 36415 COLL VENOUS BLD VENIPUNCTURE: CPT

## 2024-06-02 PROCEDURE — 99285 EMERGENCY DEPT VISIT HI MDM: CPT

## 2024-06-02 PROCEDURE — 96375 TX/PRO/DX INJ NEW DRUG ADDON: CPT

## 2024-06-02 PROCEDURE — 96374 THER/PROPH/DIAG INJ IV PUSH: CPT | Mod: XU

## 2024-06-02 PROCEDURE — 83690 ASSAY OF LIPASE: CPT

## 2024-06-02 PROCEDURE — 99284 EMERGENCY DEPT VISIT MOD MDM: CPT | Mod: 25

## 2024-06-02 PROCEDURE — 81001 URINALYSIS AUTO W/SCOPE: CPT

## 2024-06-02 PROCEDURE — 87637 SARSCOV2&INF A&B&RSV AMP PRB: CPT

## 2024-06-02 PROCEDURE — 80053 COMPREHEN METABOLIC PANEL: CPT

## 2024-06-02 PROCEDURE — 74177 CT ABD & PELVIS W/CONTRAST: CPT | Mod: MC

## 2024-06-02 PROCEDURE — 87077 CULTURE AEROBIC IDENTIFY: CPT

## 2024-06-02 PROCEDURE — 85025 COMPLETE CBC W/AUTO DIFF WBC: CPT

## 2024-06-02 PROCEDURE — 81025 URINE PREGNANCY TEST: CPT

## 2024-06-02 RX ORDER — KETOROLAC TROMETHAMINE 30 MG/ML
30 SYRINGE (ML) INJECTION ONCE
Refills: 0 | Status: DISCONTINUED | OUTPATIENT
Start: 2024-06-02 | End: 2024-06-02

## 2024-06-02 RX ORDER — SODIUM CHLORIDE 9 MG/ML
1000 INJECTION INTRAMUSCULAR; INTRAVENOUS; SUBCUTANEOUS ONCE
Refills: 0 | Status: COMPLETED | OUTPATIENT
Start: 2024-06-02 | End: 2024-06-02

## 2024-06-02 RX ORDER — CEFPODOXIME PROXETIL 100 MG
1 TABLET ORAL
Qty: 14 | Refills: 0
Start: 2024-06-02 | End: 2024-06-08

## 2024-06-02 RX ORDER — ONDANSETRON 8 MG/1
4 TABLET, FILM COATED ORAL ONCE
Refills: 0 | Status: COMPLETED | OUTPATIENT
Start: 2024-06-02 | End: 2024-06-02

## 2024-06-02 RX ADMIN — SODIUM CHLORIDE 1000 MILLILITER(S): 9 INJECTION INTRAMUSCULAR; INTRAVENOUS; SUBCUTANEOUS at 19:54

## 2024-06-02 RX ADMIN — Medication 30 MILLIGRAM(S): at 20:50

## 2024-06-02 RX ADMIN — ONDANSETRON 4 MILLIGRAM(S): 8 TABLET, FILM COATED ORAL at 19:54

## 2024-06-02 NOTE — ED PROVIDER NOTE - ATTENDING APP SHARED VISIT CONTRIBUTION OF CARE
I saw and evaluated the patient, and discussed the case with the NP. I agree with their findings and plan as documented in their note in the patient’s medical record.  Disposition: Discharge. Discussed strict return precautions with patient.

## 2024-06-02 NOTE — ED PROVIDER NOTE - CLINICAL SUMMARY MEDICAL DECISION MAKING FREE TEXT BOX
#[id number]: 39-year-old female with a past medical history of cholecystectomy, appendectomy, ovarian cyst presents with fever, headache, abdominal pain, vomiting and decreased p.o. intake for 3 days.  Patient has not taken any medications for symptoms.  Patient denies any urinary complaints, diarrhea, cough, URI symptoms.    Abdomen is soft, tender in all 4 quadrants.  Will obtain labs, urine and CT abdomen pelvis to rule out colitis, diverticulitis, SBO

## 2024-06-02 NOTE — ED PROVIDER NOTE - PRO INTERPRETER NEED 2
Doretha Fernandez is a 46 year old year old female patient here today for f/u h xof melanoma unknown primary.  She went to Dr. Cool for node dissection.1 out of 20 nodes positive.  No adjuvant Radiation.  of brain and PET scan both clear.  Was on  Nivolumab got 8 injection but got colitis.  She went to [place].   She was treated with high dose steroids for colitis and had a dx of bacterial pneumonia.   She was admitted for to hospital for this. She is followed by GI She is off of melanoma meds.  She was also dx with seronegative rheumatoid arthritis and was on humira which is not helping,  She is going to pain clinic now.  Scans havebeen clear.  She notes spot on left upper lid.   Patient reports the following modifying factors none.  Associated symptoms: none.  Patient has no other skin complaints today.  Remainder of the HPI, Meds, PMH, Allergies, FH, and SH was reviewed in chart. Current PET-CT scan, physical exam and laboratory tests demonstrate no evidence of melanoma recurrence.      Past Medical History:   Diagnosis Date     Abnormal MRI     Abnormal MRI and postive prothrombin genetic mutation.      Anxiety      Basal cell carcinoma      Cervical high risk HPV (human papillomavirus) test positive 2019    See problem list     Colitis      Depression      Diabetes mellitus, iatrogenic (H) 2020     Inflammatory arthritis      Insomnia      Lumbago     left lower back pain     Lymphedema      Malignant melanoma (H)      Melanoma (H) 10/23/2017     Mild persistent asthma      Obesity      STORMY (obstructive sleep apnea)      Prothrombin deficiency (H)     takes 81mg asa daily     Stroke (cerebrum) (H)     During      TIA (transient ischemic attack)      Type 2 diabetes mellitus (H)        Past Surgical History:   Procedure Laterality Date     APPENDECTOMY       COLONOSCOPY N/A 10/18/2017    Procedure: COLONOSCOPY;  Colon;  Surgeon: Debbie Stephens MD;  [facility]: [facility]     COLONOSCOPY N/A  3/9/2018    Procedure: COMBINED COLONOSCOPY, SINGLE OR MULTIPLE BIOPSY/POLYPECTOMY BY BIOPSY;  colon;  Surgeon: Benita Schumacher MD;  Location: [facility]     COLONOSCOPY      multiple since  to present - about 6 total     DISSECT LYMPH NODE AXILLA Left 10/23/2017    Procedure: DISSECT LYMPH NODE AXILLA;  Left Axillary Lymph Node Dissection ;  Surgeon: Laurent Cool MD;  Location: [facility]     EXAM UNDER ANESTHESIA PELVIC N/A 2020    Procedure: EXAM UNDER ANESTHESIA, PELVIS; with Cervical Biopsies, Vaginal Biopsy and Endocervical Curettings;  Surgeon: Melina Jung MD;  Location: [facility] OR     GYN SURGERY  ,          REPAIR MOHS Left 2017    Procedure: REPAIR MOHS;  Left Upper Lid Moh's Reconstruction;  Surgeon: Kisha Bosch MD;  Location: [facility]        Family History   Problem Relation Age of Onset     Cancer Mother 45        lung     Neurologic Disorder Mother         epilepsy     Lipids Father      Gastrointestinal Disease Father         diverticulitis      Depression Father      Cancer Maternal Grandmother      Blood Disease Maternal Grandmother         lymphoma      Arthritis Maternal Grandmother      Diabetes Maternal Grandmother      Depression Maternal Grandmother      Macular Degeneration Maternal Grandmother      Glaucoma Maternal Grandmother      Diabetes Maternal Grandfather      Cerebrovascular Disease Maternal Grandfather      Blood Disease Maternal Grandfather      Heart Disease Maternal Grandfather      Glaucoma Maternal Grandfather      Cancer Paternal Grandmother      Cancer - colorectal Paternal Grandmother      Respiratory Paternal Grandfather         emphysema      Heart Disease Daughter      Asthma Daughter      Depression Sister      Melanoma No family hx of        Social History     Socioeconomic History     Marital status:      Spouse name: Not on file     Number of children: Not on file     Years of education: Not on file     Highest education level: Not  on file   Occupational History     Not on file   Social Needs     Financial resource strain: Not on file     Food insecurity     Worry: Not on file     Inability: Not on file     Transportation needs     Medical: Not on file     Non-medical: Not on file   Tobacco Use     Smoking status: Former Smoker     Packs/day: 1.00     Years: 5.00     Pack years: 5.00     Last attempt to quit: 3/20/1998     Years since quittin.0     Smokeless tobacco: Never Used   Substance and Sexual Activity     Alcohol use: Yes     Comment: occ     Drug use: No     Sexual activity: Not Currently     Partners: Male     Birth control/protection: Surgical   Lifestyle     Physical activity     Days per week: Not on file     Minutes per session: Not on file     Stress: Not on file   Relationships     Social connections     Talks on phone: Not on file     Gets together: Not on file     Attends Zoroastrianism service: Not on file     Active member of club or organization: Not on file     Attends meetings of clubs or organizations: Not on file     Relationship status: Not on file     Intimate partner violence     Fear of current or ex partner: Not on file     Emotionally abused: Not on file     Physically abused: Not on file     Forced sexual activity: Not on file   Other Topics Concern     Parent/sibling w/ CABG, MI or angioplasty before 65F 55M? No   Social History Narrative    21 y.o- patient's mother   of lung cancer. She had to take care of her younger sister.    2012- patient's  had a heart attack with stents placed, followed by cardiac rehabilitation    2000 TO 2012  was in [facility] for depression    2013 patient's  went through alcohol rehabilitation at [facility]            They have attended couple counseling a couple of times and patient went to the family program for chemical dependency.    Patient denies alcohol or drug use and herself             Has 2 children, girls ages 10 and 13     For a while she was working 3 jobs since her  was ill. Works at the [employer] for [employer]. Reports her job is very stressful.           Outpatient Encounter Medications as of 4/6/2020   Medication Sig Dispense Refill     Acetaminophen (TYLENOL PO) Take 1,000 mg by mouth every 8 hours as needed for mild pain or fever (Pt last dose 02/10/20)        albuterol (PROAIR HFA/PROVENTIL HFA/VENTOLIN HFA) 108 (90 Base) MCG/ACT inhaler Inhale 2 puffs into the lungs 4 times daily (Patient taking differently: Inhale 2 puffs into the lungs 4 times daily Pt has not needed to use 2/10/20) 1 Inhaler 3     blood glucose (ACCU-CHEK GUIDE) test strip 1 strip by In Vitro route 2 times daily Use to test blood sugar 2 times daily or as directed. 200 strip 11     blood glucose monitoring (ACCU-CHEK FASTCLIX) lancets 1 each by In Vitro route 2 times daily Use to test blood sugar 2 times daily or as directed.  Ok to substitute alternative if insurance prefers. 102 each 11     Calcium Carb-Cholecalciferol 600-800 MG-UNIT TABS Take 800 mg by mouth daily before breakfast       Calcium Carbonate (CALCIUM-CARB 600 PO) Take 1 tablet by mouth as needed (Pt last took 1 week ago 2/10/20)        ferrous fumarate 65 mg, [place]. FE,-Vitamin C 125 mg (VITRON-C)  MG TABS tablet Take 1 tablet by mouth daily 30 tablet 3     gabapentin (NEURONTIN) 300 MG capsule Take 300 mg by mouth 3 times daily       hydrOXYzine (ATARAX) 25 MG tablet Take 1 tablet (25 mg) by mouth At Bedtime (Patient taking differently: Take 25 mg by mouth At Bedtime Pt has not taken in past 2 weeks 2/10/20) 90 tablet 1     metFORMIN (GLUCOPHAGE-XR) 500 MG 24 hr tablet Take 2 tablets (1,000 mg) by mouth daily (with dinner) (Patient taking differently: Take 500 mg by mouth 2 times daily Transitioned to this schedule 1 week ago 2/10/20) 360 tablet 1     ondansetron (ZOFRAN) 8 MG tablet Take 8 mg by mouth every 8 hours as  "needed for nausea (Pt has not taken in past year 2/10/20)        order for DME Equipment being ordered: X2 [employer] Compression shirt. 2 each 1     order for DME Equipment being ordered: 20-30 mmHg compression sleeve and 20-30 mmHg compression glove x 2 pair. 2 each 1     order for DME Equipment being ordered: x2 [employer] compression shirt 1 each 0     order for DME Equipment being ordered: 20-30mmHg compression sleeve and 20-30mmHg compression glove\" x2 pair 1 Units 0     predniSONE (DELTASONE) 5 MG tablet Take 2.5 tablets (12.5 mg) by mouth daily for 9 days, THEN 2 tablets (10 mg) daily for 14 days, THEN 1.5 tablets (7.5 mg) daily for 14 days, THEN 1 tablet (5 mg) daily for 14 days, THEN 0.5 tablets (2.5 mg) daily for 14 days. 93 tablet 0     predniSONE (DELTASONE) 5 MG tablet Prednisone taper: Starting @ Prednisone 17.5 mg/day and decreasing by 2.5 mg every 2 weeks until off this med. 100 tablet 0     predniSONE (DELTASONE) 5 MG tablet Take 1 tablet (5 mg) by mouth daily Prednisone taper:  30 mg p.o. daily x7 days.  27.5 mg p.o. daily x7 days.  25 mg p.o. daily x7 days.  22.5 mg p.o. daily x7 days. (Patient taking differently: Take 20 mg by mouth daily Prednisone taper:  30 mg p.o. daily x7 days.  27.5 mg p.o. daily x7 days.  25 mg p.o. daily x7 days.  22.5 mg p.o. daily x7 days.) 147 tablet 0     venlafaxine (EFFEXOR-ER) 225 MG 24 hr tablet TAKE ONE TABLET BY MOUTH ONCE DAILY 90 tablet 0     VITAMIN D3 25 MCG (1000 UT) tablet TAKE THREE TABLETS BY MOUTH EVERY  tablet 0     Facility-Administered Encounter Medications as of 4/6/2020   Medication Dose Route Frequency Provider Last Rate Last Dose     lidocaine 1 % 9 mL  9 mL Intradermal Once Anna Naidu MD         sodium bicarbonate 8.4 % injection 1 mEq  1 mEq Intradermal Once Anna Naidu MD                 Review Of Systems  Skin: As above  Eyes: negative  Ears/Nose/Throat: negative  Respiratory: No shortness of breath, dyspnea on exertion, cough, " or hemoptysis  Cardiovascular: negative  Gastrointestinal: negative  Genitourinary: negative  Musculoskeletal: negative  Neurologic: negative  Psychiatric: negative  Hematologic/Lymphatic/Immunologic: negative  Endocrine: negative      O:   NAD, WDWN, Alert & Oriented, Mood & Affect wnl, Vitals stable   Here today alone   /73 (BP Location: Right arm, Patient Position: Chair, Cuff Size: Adult Large)   Pulse 88   SpO2 95%    General appearance normal   Vitals stable   Alert, oriented and in no acute distress      Following lymph nodes palpated: Occipital, Cervical, Supraclavicular , axilla, inguinal, femoral no lad      Stuck on papules and brown macules on trunk and ext                                          Pink papules on trunk                           Red papules on trunk   L upepr lid shiny 2mm papule     The remainder of the full exam was normal; the following areas were examined:  conjunctiva/lids, oral mucosa, neck, peripheral vascular system, abdomen, lymph nodes, digits/nails, eccrine and apocrine glands, scalp/hair, face, neck, chest, abdomen, buttocks, back, RUE, LUE, RLE, LLE       Eyes: Conjunctivae/lids:Normal     ENT: Lips, buccal mucosa, tongue: normal    MSK:Normal    Cardiovascular: peripheral edema none    Pulm: Breathing Normal    Lymph Nodes: No Head and Neck Lymphadenopathy     Neuro/Psych: Orientation:Alert and Orientedx3 ; Mood/Affect:normal       A/P:  1. Metastatic melanoma, seborrheic keratosis, lentigo, dermal nevi, angioma, hx of basal cell carcinoma     2. L upepr lid r/o basal cell carcinoma   Incisional BIOPSY SENT OUT:  After consent, anesthesia with LEC and prep, incision performed and specimen sent out for permanent section histology.  No complications and routine wound care. Patient told to call our office in 1-2 weeks for result.              MELANOMA DISCUSSED WITH PATIENT:  I discussed the specifics of tumor, prognosis, metachronous melanoma, self exam, and genetics  English with the patient. I explained the need for monthly skin exams including and taught the patient how to do this. Patient was asked about new or changing moles and a full skin exam was performed.   BENIGN LESIONS DISCUSSED WITH PATIENT:  I discussed the specifics of tumor, prognosis, and genetics of benign lesions.  I explained that treatment of these lesions would be purely cosmetic and not medically neccessary.  I discussed with patient different removal options including excision, cautery and /or laser.        Nature and genetics of benign skin lesions dicussed with patient.  Signs and Symptoms of skin cancer discussed with patient.  ABCDEs of melanoma reviewed with patient.  Patient encouraged to perform monthly skin exams.  UV precautions reviewed with patient.  Skin care regimen reviewed with patient: Eliminate harsh soaps, i.e. Dial, zest, irsih spring; Mild soaps such as Cetaphil or Dove sensitive skin, avoid hot or cold showers, aggressive use of emollients including vanicream, cetaphil or cerave discussed with patient.    Risks of non-melanoma skin cancer discussed with patient   Return to clinic 3 months

## 2024-06-02 NOTE — ED PROVIDER NOTE - OBJECTIVE STATEMENT
#[id number]: 39-year-old female with a past medical history of cholecystectomy, appendectomy, ovarian cyst presents with fever, headache, abdominal pain, vomiting and decreased p.o. intake for 3 days.  Patient has not taken any medications for symptoms.  Patient denies any urinary complaints, diarrhea, cough, URI symptoms.

## 2024-06-02 NOTE — ED PROVIDER NOTE - PATIENT PORTAL LINK FT
You can access the FollowMyHealth Patient Portal offered by [facility] by registering at the following website: [email or web address]. By joining [employer]’s FollowMyHealth portal, you will also be able to view your health information using other applications (apps) compatible with our system.

## 2024-06-02 NOTE — ED PROVIDER NOTE - NSFOLLOWUPINSTRUCTIONS_ED_ALL_ED_FT
Yulisa un seguimiento con pugh médico de atención primaria dentro de 1 semana.       Antibióticos para pugh infección del tracto urinario enviados a la farmacia.  Tómelo según las indicaciones y hasta completar todo el medicamento, incluso si se siente mejor.    Puede josh Motrin (ibuprofeno) 600 mg cada 6 horas o Tylenol (acetaminofén) 1000 mg cada 6 horas según sea necesario para el dolor o la fiebre.     Si experimenta algún síntoma nuevo o que empeora, o si está preocupado, siempre puede regresar a emergencias para marlon reevaluación.    Follow up with your primary care doctor within 1 week.       Antibiotics for your urinary tract infection sent to the pharmacy.  Take as directed and until all of the medication is completed even if you are feeling better.    You can take Motrin (ibuprofen) 600 mg every 6 hours or Tylenol (acetaminophen) 1000 mg every 6 hours as needed for pain or fever.     If you experience any new or worsening symptoms or if you are concerned you can always come back to the emergency for a re-evaluation.

## 2024-06-02 NOTE — ED ADULT NURSE NOTE - ED STAT RN HANDOFF DETAILS
Patient is a/o x 4, not in any form of distress. Awaiting CT result. Patient is transferred to main ED and report given to MANJU Rubio for continuity of plan of care.

## 2024-06-04 LAB
CULTURE RESULTS: ABNORMAL
SPECIMEN SOURCE: SIGNIFICANT CHANGE UP

## 2024-07-10 ENCOUNTER — NON-APPOINTMENT (OUTPATIENT)
Age: 39
End: 2024-07-10

## 2024-07-11 ENCOUNTER — OUTPATIENT (OUTPATIENT)
Dept: OUTPATIENT SERVICES | Facility: HOSPITAL | Age: 39
LOS: 1 days | End: 2024-07-11
Payer: MEDICAID

## 2024-07-11 ENCOUNTER — APPOINTMENT (OUTPATIENT)
Dept: INTERNAL MEDICINE | Facility: CLINIC | Age: 39
End: 2024-07-11
Payer: COMMERCIAL

## 2024-07-11 VITALS
BODY MASS INDEX: 31.28 KG/M2 | OXYGEN SATURATION: 99 % | WEIGHT: 145 LBS | TEMPERATURE: 97.2 F | DIASTOLIC BLOOD PRESSURE: 72 MMHG | HEART RATE: 86 BPM | SYSTOLIC BLOOD PRESSURE: 105 MMHG | HEIGHT: 57 IN

## 2024-07-11 DIAGNOSIS — R10.9 UNSPECIFIED ABDOMINAL PAIN: ICD-10-CM

## 2024-07-11 DIAGNOSIS — Z00.00 ENCOUNTER FOR GENERAL ADULT MEDICAL EXAMINATION WITHOUT ABNORMAL FINDINGS: ICD-10-CM

## 2024-07-11 DIAGNOSIS — Z12.4 ENCOUNTER FOR SCREENING FOR MALIGNANT NEOPLASM OF CERVIX: ICD-10-CM

## 2024-07-11 DIAGNOSIS — F32.A DEPRESSION, UNSPECIFIED: ICD-10-CM

## 2024-07-11 DIAGNOSIS — Z00.00 ENCOUNTER FOR GENERAL ADULT MEDICAL EXAMINATION W/OUT ABNORMAL FINDINGS: ICD-10-CM

## 2024-07-11 DIAGNOSIS — E66.9 OBESITY, UNSPECIFIED: ICD-10-CM

## 2024-07-11 DIAGNOSIS — M06.9 RHEUMATOID ARTHRITIS, UNSPECIFIED: ICD-10-CM

## 2024-07-11 PROCEDURE — 80061 LIPID PANEL: CPT

## 2024-07-11 PROCEDURE — 85027 COMPLETE CBC AUTOMATED: CPT

## 2024-07-11 PROCEDURE — 86803 HEPATITIS C AB TEST: CPT

## 2024-07-11 PROCEDURE — 80053 COMPREHEN METABOLIC PANEL: CPT

## 2024-07-11 PROCEDURE — ZZZZZ: CPT

## 2024-07-11 PROCEDURE — G0463: CPT

## 2024-07-12 ENCOUNTER — NON-APPOINTMENT (OUTPATIENT)
Age: 39
End: 2024-07-12

## 2024-07-12 LAB
ALBUMIN SERPL ELPH-MCNC: 4.2 G/DL
ALT SERPL-CCNC: 25 U/L
ANION GAP SERPL CALC-SCNC: 12 MMOL/L
AST SERPL-CCNC: 15 U/L
BILIRUB SERPL-MCNC: 0.4 MG/DL
CALCIUM SERPL-MCNC: 9.3 MG/DL
CHLORIDE SERPL-SCNC: 105 MMOL/L
CHOLEST SERPL-MCNC: 197 MG/DL
CO2 SERPL-SCNC: 22 MMOL/L
CREAT SERPL-MCNC: 0.62 MG/DL
EGFR: 118 ML/MIN/1.73M2
GLUCOSE SERPL-MCNC: 108 MG/DL
HCT VFR BLD CALC: 31.8 %
HCV AB SER QL: NONREACTIVE
HCV S/CO RATIO: 0.09 S/CO
HGB BLD-MCNC: 10.2 G/DL
LDLC SERPL CALC-MCNC: 123 MG/DL
MCHC RBC-ENTMCNC: 27 PG
MCHC RBC-ENTMCNC: 32.1 GM/DL
MCV RBC AUTO: 84.1 FL
NONHDLC SERPL-MCNC: 152 MG/DL
PLATELET # BLD AUTO: 397 K/UL
POTASSIUM SERPL-SCNC: 4.3 MMOL/L
PROT SERPL-MCNC: 6.9 G/DL
RBC # BLD: 3.78 M/UL
RBC # FLD: 16 %
SODIUM SERPL-SCNC: 139 MMOL/L
TRIGL SERPL-MCNC: 167 MG/DL
WBC # FLD AUTO: 10.17 K/UL

## 2024-07-17 DIAGNOSIS — E66.9 OBESITY, UNSPECIFIED: ICD-10-CM

## 2024-07-17 DIAGNOSIS — M06.9 RHEUMATOID ARTHRITIS, UNSPECIFIED: ICD-10-CM

## 2024-07-17 DIAGNOSIS — Z12.4 ENCOUNTER FOR SCREENING FOR MALIGNANT NEOPLASM OF CERVIX: ICD-10-CM

## 2024-07-17 DIAGNOSIS — F32.A DEPRESSION, UNSPECIFIED: ICD-10-CM

## 2024-07-29 ENCOUNTER — APPOINTMENT (OUTPATIENT)
Dept: ULTRASOUND IMAGING | Facility: CLINIC | Age: 39
End: 2024-07-29
Payer: COMMERCIAL

## 2024-07-29 DIAGNOSIS — K83.5 BILIARY CYST: ICD-10-CM

## 2024-07-29 PROCEDURE — 76705 ECHO EXAM OF ABDOMEN: CPT

## 2024-08-02 ENCOUNTER — OUTPATIENT (OUTPATIENT)
Dept: OUTPATIENT SERVICES | Facility: HOSPITAL | Age: 39
LOS: 1 days | End: 2024-08-02
Payer: SELF-PAY

## 2024-08-02 ENCOUNTER — APPOINTMENT (OUTPATIENT)
Dept: INTERNAL MEDICINE | Facility: CLINIC | Age: 39
End: 2024-08-02
Payer: COMMERCIAL

## 2024-08-02 VITALS
WEIGHT: 146 LBS | OXYGEN SATURATION: 98 % | DIASTOLIC BLOOD PRESSURE: 69 MMHG | TEMPERATURE: 97.4 F | SYSTOLIC BLOOD PRESSURE: 109 MMHG | BODY MASS INDEX: 31.5 KG/M2 | RESPIRATION RATE: 18 BRPM | HEIGHT: 57 IN

## 2024-08-02 DIAGNOSIS — Z87.42 PERSONAL HISTORY OF OTHER DISEASES OF THE FEMALE GENITAL TRACT: ICD-10-CM

## 2024-08-02 DIAGNOSIS — Z00.00 ENCOUNTER FOR GENERAL ADULT MEDICAL EXAMINATION WITHOUT ABNORMAL FINDINGS: ICD-10-CM

## 2024-08-02 DIAGNOSIS — M06.9 RHEUMATOID ARTHRITIS, UNSPECIFIED: ICD-10-CM

## 2024-08-02 DIAGNOSIS — R10.9 UNSPECIFIED ABDOMINAL PAIN: ICD-10-CM

## 2024-08-02 PROCEDURE — ZZZZZ: CPT

## 2024-08-02 PROCEDURE — G0463: CPT

## 2024-08-03 NOTE — PLAN
[FreeTextEntry1] : #RA  Patient has an upcoming appt on Oct 11, 24 with rheumatologist via sliding fee scale program  #Abdominal Pain  Discussed results of US MRCP recommended   # Irrgular Period & Hx of PCOS  GYN Referral

## 2024-08-03 NOTE — END OF VISIT
[] : Resident [FreeTextEntry3] : I, Dr. Constantin Hurd, saw and evaluated this patient in the presence of the resident. I discussed the management with the resident. I reviewed the note and agree with the documented plan of care with the following confirmations/corrections/additions: None.

## 2024-08-03 NOTE — HISTORY OF PRESENT ILLNESS
[FreeTextEntry1] : Joint Pain and Right upper Quadrant Pain  [de-identified] : [place]-speaking female from [facility] migrated 5 years ago, uninsured, with diagnosed RA for two years.  The patient is complaining of worsening joint pain, including in her fingers, wrist and ankles for 7 months . The patient works as a  and can only work 3 days because her joint pain doesn't allow her to work more than 6 hours/day. the patient uses diclofenac with minor relief.  Patient is also complaining of right upper quadrant pain, described as a dull pain, rated 5/10 for most days, however, somedays can be 10/10 especially at night, feels like a cramp.  The pain is not new and has always been there since she had the cholecystectomy 7 years ago; however, it is progressively getting worse. The patient has not seen a PCP for over a year but has an upcoming appointment on 2024, with Rheumatologist via a sliding fee scale program.  She is not currently taking any medications except for diclofenac as she does not have insurance, she was told her insurance  and could not longer renew due to her legal status.

## 2024-08-03 NOTE — PHYSICAL EXAM
[No Acute Distress] : no acute distress [Well-Appearing] : well-appearing [Normal Sclera/Conjunctiva] : normal sclera/conjunctiva [Normal Oropharynx] : the oropharynx was normal [No Lymphadenopathy] : no lymphadenopathy [No Respiratory Distress] : no respiratory distress  [Clear to Auscultation] : lungs were clear to auscultation bilaterally [Normal Rate] : normal rate  [Regular Rhythm] : with a regular rhythm [Normal S1, S2] : normal S1 and S2 [No Murmur] : no murmur heard [Pedal Pulses Present] : the pedal pulses are present [No Edema] : there was no peripheral edema [Soft] : abdomen soft [Normal Bowel Sounds] : normal bowel sounds [Normal Anterior Cervical Nodes] : no anterior cervical lymphadenopathy [No Joint Swelling] : no joint swelling [Grossly Normal Strength/Tone] : grossly normal strength/tone [No Focal Deficits] : no focal deficits [Alert and Oriented x3] : oriented to person, place, and time [Normal Mood] : the mood was normal [de-identified] : Right upper quadrant tenderness upon deep palpation

## 2024-08-03 NOTE — REVIEW OF SYSTEMS
[Fatigue] : fatigue [Hot Flashes] : hot flashes [Night Sweats] : night sweats [Recent Change In Weight] : ~T recent weight change [Pain] : pain [Redness] : redness [Dryness] : dryness  [Nasal Discharge] : nasal discharge [Lower Ext Edema] : lower extremity edema [Shortness Of Breath] : shortness of breath [Dyspnea on Exertion] : dyspnea on exertion [Abdominal Pain] : abdominal pain [Nausea] : nausea [Constipation] : constipation [Joint Pain] : joint pain [Joint Stiffness] : joint stiffness [Joint Swelling] : joint swelling [Negative] : Heme/Lymph [Chest Pain] : no chest pain [Palpitations] : no palpitations

## 2024-08-06 DIAGNOSIS — Z87.42 PERSONAL HISTORY OF OTHER DISEASES OF THE FEMALE GENITAL TRACT: ICD-10-CM

## 2024-08-06 DIAGNOSIS — M06.9 RHEUMATOID ARTHRITIS, UNSPECIFIED: ICD-10-CM

## 2024-08-06 DIAGNOSIS — R10.9 UNSPECIFIED ABDOMINAL PAIN: ICD-10-CM

## 2024-08-09 ENCOUNTER — APPOINTMENT (OUTPATIENT)
Dept: MRI IMAGING | Facility: CLINIC | Age: 39
End: 2024-08-09

## 2024-08-09 PROCEDURE — 74183 MRI ABD W/O CNTR FLWD CNTR: CPT

## 2024-08-09 PROCEDURE — A9585: CPT

## 2024-08-14 DIAGNOSIS — K80.20 CALCULUS OF GALLBLADDER W/OUT CHOLECYSTITIS W/OUT OBSTRUCTION: ICD-10-CM

## 2024-08-15 ENCOUNTER — NON-APPOINTMENT (OUTPATIENT)
Age: 39
End: 2024-08-15

## 2024-08-30 ENCOUNTER — APPOINTMENT (OUTPATIENT)
Dept: INTERNAL MEDICINE | Facility: CLINIC | Age: 39
End: 2024-08-30

## 2024-09-04 ENCOUNTER — OUTPATIENT (OUTPATIENT)
Dept: OUTPATIENT SERVICES | Facility: HOSPITAL | Age: 39
LOS: 1 days | End: 2024-09-04
Payer: MEDICAID

## 2024-09-04 ENCOUNTER — APPOINTMENT (OUTPATIENT)
Dept: OBGYN | Facility: CLINIC | Age: 39
End: 2024-09-04
Payer: COMMERCIAL

## 2024-09-04 VITALS
DIASTOLIC BLOOD PRESSURE: 73 MMHG | BODY MASS INDEX: 32.58 KG/M2 | OXYGEN SATURATION: 99 % | WEIGHT: 151 LBS | HEIGHT: 57 IN | SYSTOLIC BLOOD PRESSURE: 109 MMHG | RESPIRATION RATE: 16 BRPM | TEMPERATURE: 97.8 F | HEART RATE: 72 BPM

## 2024-09-04 DIAGNOSIS — Z01.419 ENCOUNTER FOR GYNECOLOGICAL EXAMINATION (GENERAL) (ROUTINE) W/OUT ABNORMAL FINDINGS: ICD-10-CM

## 2024-09-04 DIAGNOSIS — Z83.49 FAMILY HISTORY OF OTHER ENDOCRINE, NUTRITIONAL AND METABOLIC DISEASES: ICD-10-CM

## 2024-09-04 DIAGNOSIS — Z00.00 ENCOUNTER FOR GENERAL ADULT MEDICAL EXAMINATION WITHOUT ABNORMAL FINDINGS: ICD-10-CM

## 2024-09-04 PROCEDURE — 87591 N.GONORRHOEAE DNA AMP PROB: CPT

## 2024-09-04 PROCEDURE — G0444 DEPRESSION SCREEN ANNUAL: CPT | Mod: 59

## 2024-09-04 PROCEDURE — G0463: CPT

## 2024-09-04 PROCEDURE — 87491 CHLMYD TRACH DNA AMP PROBE: CPT

## 2024-09-04 PROCEDURE — 99395 PREV VISIT EST AGE 18-39: CPT

## 2024-09-04 PROCEDURE — 87624 HPV HI-RISK TYP POOLED RSLT: CPT

## 2024-09-05 LAB
C TRACH RRNA SPEC QL NAA+PROBE: NOT DETECTED
N GONORRHOEA RRNA SPEC QL NAA+PROBE: NOT DETECTED
SOURCE TP AMPLIFICATION: NORMAL

## 2024-09-06 ENCOUNTER — OUTPATIENT (OUTPATIENT)
Dept: OUTPATIENT SERVICES | Facility: HOSPITAL | Age: 39
LOS: 1 days | End: 2024-09-06
Payer: SELF-PAY

## 2024-09-06 ENCOUNTER — APPOINTMENT (OUTPATIENT)
Dept: INTERNAL MEDICINE | Facility: CLINIC | Age: 39
End: 2024-09-06
Payer: COMMERCIAL

## 2024-09-06 VITALS
SYSTOLIC BLOOD PRESSURE: 111 MMHG | DIASTOLIC BLOOD PRESSURE: 71 MMHG | RESPIRATION RATE: 16 BRPM | WEIGHT: 154 LBS | TEMPERATURE: 97.7 F | BODY MASS INDEX: 33.22 KG/M2 | HEART RATE: 79 BPM | OXYGEN SATURATION: 98 % | HEIGHT: 57 IN

## 2024-09-06 DIAGNOSIS — I01.1 ACUTE RHEUMATIC ENDOCARDITIS: ICD-10-CM

## 2024-09-06 DIAGNOSIS — Z01.419 ENCOUNTER FOR GYNECOLOGICAL EXAMINATION (GENERAL) (ROUTINE) WITHOUT ABNORMAL FINDINGS: ICD-10-CM

## 2024-09-06 DIAGNOSIS — K29.50 UNSPECIFIED CHRONIC GASTRITIS W/OUT BLEEDING: ICD-10-CM

## 2024-09-06 DIAGNOSIS — M54.50 LOW BACK PAIN, UNSPECIFIED: ICD-10-CM

## 2024-09-06 DIAGNOSIS — F32.A DEPRESSION, UNSPECIFIED: ICD-10-CM

## 2024-09-06 DIAGNOSIS — R10.9 UNSPECIFIED ABDOMINAL PAIN: ICD-10-CM

## 2024-09-06 DIAGNOSIS — K83.5 BILIARY CYST: ICD-10-CM

## 2024-09-06 DIAGNOSIS — M06.9 RHEUMATOID ARTHRITIS, UNSPECIFIED: ICD-10-CM

## 2024-09-06 DIAGNOSIS — K76.0 FATTY (CHANGE OF) LIVER, NOT ELSEWHERE CLASSIFIED: ICD-10-CM

## 2024-09-06 DIAGNOSIS — Z00.00 ENCOUNTER FOR GENERAL ADULT MEDICAL EXAMINATION WITHOUT ABNORMAL FINDINGS: ICD-10-CM

## 2024-09-06 LAB — HPV HIGH+LOW RISK DNA PNL CVX: NOT DETECTED

## 2024-09-06 PROCEDURE — G0463: CPT

## 2024-09-06 PROCEDURE — ZZZZZ: CPT

## 2024-09-06 RX ORDER — PANTOPRAZOLE 40 MG/1
40 TABLET, DELAYED RELEASE ORAL DAILY
Qty: 14 | Refills: 0 | Status: ACTIVE | COMMUNITY
Start: 2024-09-06 | End: 1900-01-01

## 2024-09-06 RX ORDER — IBUPROFEN 600 MG/1
600 TABLET, FILM COATED ORAL 3 TIMES DAILY
Qty: 15 | Refills: 0 | Status: ACTIVE | COMMUNITY
Start: 2024-09-06 | End: 1900-01-01

## 2024-09-06 NOTE — PHYSICAL EXAM
[Soft] : abdomen soft [Non-distended] : non-distended [Normal Bowel Sounds] : normal bowel sounds [No CVA Tenderness] : no CVA  tenderness [No Acute Distress] : no acute distress [Well Nourished] : well nourished [Well Developed] : well developed [Well-Appearing] : well-appearing [Supple] : supple [No Respiratory Distress] : no respiratory distress  [No Accessory Muscle Use] : no accessory muscle use [Clear to Auscultation] : lungs were clear to auscultation bilaterally [Normal Rate] : normal rate  [Regular Rhythm] : with a regular rhythm [Normal S1, S2] : normal S1 and S2 [No Murmur] : no murmur heard [No Edema] : there was no peripheral edema [de-identified] : tenderness to palpation of LLQ, RLQ and RUQ, no guarding or rebounding tenderness [de-identified] : low back spinous tenderness to coccyx, left low  to palpation, full ROM of spine and hips, pain with extension of the spine and pain with flexion and extension of left leg

## 2024-09-06 NOTE — REVIEW OF SYSTEMS
[Abdominal Pain] : abdominal pain [Nausea] : nausea [Back Pain] : back pain [Vomiting] : no vomiting [Dysuria] : no dysuria [Hematuria] : no hematuria [FreeTextEntry9] : low back spinous processes tenderness

## 2024-09-06 NOTE — HEALTH RISK ASSESSMENT
[2] : 1) Little interest or pleasure doing things for more than half of the days (2) [1] : 2) Feeling down, depressed, or hopeless for several days (1) [PHQ-9 Positive] : PHQ-9 Positive [[id number]] : 4

## 2024-09-06 NOTE — HISTORY OF PRESENT ILLNESS
[FreeTextEntry1] : follow up [de-identified] : Patient is a 39 year old female with PMHx of RA, HLD, pyelonephritis, gastritis and PCOS here for a follow up. Patient would like to know the results of her MRCP on 8/9/24. Patient still reports intermittent right upper quadrant pain, described as a dull pain but sometimes there is a sharp, cramping pain with movement. Rated as 5/10. The pain is not new and has always been there since she had the cholecystectomy 7 years ago. Pain is the same since her last visit here. Reports nausea, constipation, bloating and a gassy feeling; denies vomiting, SOB or chest pain. No alleviating factors.    Patient also complains of low back pain that started 3 days ago. The pain is worst at the spinous process. Rated 5/10. Pain is described as "when you have a period" but she is not menstruating. Able to have a BM or urinate. Lying supine exacerbates her pain; and she has to place a pillow at her low back when lying supine helps alleviate her pain. Reports chills for the past 3 days. Denies numbness, tingling, fall or trauma to the area. Denies dysuria, pain with urination or hematuria.

## 2024-09-06 NOTE — PLAN
[FreeTextEntry1] : Patient is a 39 year old female with PMHx of RA, HLD, pyelonephritis, gastritis and PCOS here for a follow up.   1) Low back pain - worse at the spinal processes and chills for the past 3 days, is able to urinate and have a BM, denies numbness or tingling down lower extremities - CVA tenderness neg, no tenderness or erythema of spinal region, tender to palpation of left lower back on physical exam - ordered CT lumbar spine w/ contrast - to r/o OM, abscess of spine or any infectious process  - f/u CBC with diff, ESR and CRP - to r/o osteomyelitis, abscess of spine or any infectious process - recommend taking ibuprofen 600mg as needed for severe pain, tylenol as needed for mild-moderate pain - take pantoprazole 40mg once before breakfast for 2 weeks due to hx of gastritis and taking NSAIDs as needed for severe pain  2) Abdominal pain - s/p cholecystectomy 7 years ago - discussed the results of her MRCP: dilated cystic duct remanent with layering sludge - ordered CT abdomen w/ contrast - f/u CMP - will refer to GI and/or Gen. Surgery if abd. pain does not improve or need further work-up  3) Mild diffuse hepatic steatosis  - found on MRCP - recommend weight loss and exercise - f/u CMP   5) depression - PHQ-2: 3, PHQ-9: 10 - patient talked to  2 days ago, defers talking to them today - denies SI/HI, thoughts of harming herself - Declines starting SSRI or any antidepressants for now - If patient has thoughts of harming herself or suicidal ideations, to go to the hospital  f/u in 2 weeks

## 2024-09-06 NOTE — END OF VISIT
[] : Resident [FreeTextEntry3] : I, Dr. Adolfo Cm, saw and evaluated this patient in the presence of the resident. I discussed the management with the resident. I reviewed the note and agree with the documented plan of care with the following confirmations/corrections/additions: None.    [Time Spent: ___ minutes] : I have spent [unfilled] minutes of time on the encounter which excludes teaching and separately reported services.

## 2024-09-09 ENCOUNTER — NON-APPOINTMENT (OUTPATIENT)
Age: 39
End: 2024-09-09

## 2024-09-09 LAB
ALBUMIN SERPL ELPH-MCNC: 4.1 G/DL
ALP BLD-CCNC: 82 U/L
ALT SERPL-CCNC: 22 U/L
ANION GAP SERPL CALC-SCNC: 11 MMOL/L
AST SERPL-CCNC: 18 U/L
BASOPHILS # BLD AUTO: 0.03 K/UL
BASOPHILS NFR BLD AUTO: 0.3 %
BILIRUB SERPL-MCNC: 0.3 MG/DL
BUN SERPL-MCNC: 22 MG/DL
CALCIUM SERPL-MCNC: 8.7 MG/DL
CHLORIDE SERPL-SCNC: 107 MMOL/L
CO2 SERPL-SCNC: 22 MMOL/L
CREAT SERPL-MCNC: 0.72 MG/DL
CRP SERPL-MCNC: 14 MG/L
CYTOLOGY CVX/VAG DOC THIN PREP: ABNORMAL
EGFR: 110 ML/MIN/1.73M2
EOSINOPHIL # BLD AUTO: 0.06 K/UL
EOSINOPHIL NFR BLD AUTO: 0.6 %
ERYTHROCYTE [SEDIMENTATION RATE] IN BLOOD BY WESTERGREN METHOD: 42 MM/HR
GLUCOSE SERPL-MCNC: 80 MG/DL
HCT VFR BLD CALC: 33.9 %
HGB BLD-MCNC: 10.2 G/DL
IMM GRANULOCYTES NFR BLD AUTO: 0.8 %
LYMPHOCYTES # BLD AUTO: 2.26 K/UL
LYMPHOCYTES NFR BLD AUTO: 23.9 %
MAN DIFF?: NORMAL
MCHC RBC-ENTMCNC: 27.3 PG
MCHC RBC-ENTMCNC: 30.1 GM/DL
MCV RBC AUTO: 90.6 FL
MONOCYTES # BLD AUTO: 1.16 K/UL
MONOCYTES NFR BLD AUTO: 12.3 %
NEUTROPHILS # BLD AUTO: 5.87 K/UL
NEUTROPHILS NFR BLD AUTO: 62.1 %
PLATELET # BLD AUTO: 348 K/UL
POTASSIUM SERPL-SCNC: 4.4 MMOL/L
PROT SERPL-MCNC: 6.5 G/DL
RBC # BLD: 3.74 M/UL
RBC # FLD: 17 %
SODIUM SERPL-SCNC: 141 MMOL/L
WBC # FLD AUTO: 9.46 K/UL

## 2024-09-10 DIAGNOSIS — R10.9 UNSPECIFIED ABDOMINAL PAIN: ICD-10-CM

## 2024-09-10 DIAGNOSIS — F32.A DEPRESSION, UNSPECIFIED: ICD-10-CM

## 2024-09-10 DIAGNOSIS — K29.50 UNSPECIFIED CHRONIC GASTRITIS WITHOUT BLEEDING: ICD-10-CM

## 2024-09-10 DIAGNOSIS — M54.50 LOW BACK PAIN, UNSPECIFIED: ICD-10-CM

## 2024-09-10 DIAGNOSIS — K76.0 FATTY (CHANGE OF) LIVER, NOT ELSEWHERE CLASSIFIED: ICD-10-CM

## 2024-09-10 DIAGNOSIS — K83.5 BILIARY CYST: ICD-10-CM

## 2024-09-10 DIAGNOSIS — M06.9 RHEUMATOID ARTHRITIS, UNSPECIFIED: ICD-10-CM

## 2024-09-16 ENCOUNTER — APPOINTMENT (OUTPATIENT)
Dept: CT IMAGING | Facility: CLINIC | Age: 39
End: 2024-09-16
Payer: COMMERCIAL

## 2024-09-16 PROCEDURE — 72132 CT LUMBAR SPINE W/DYE: CPT

## 2024-09-16 PROCEDURE — 74160 CT ABDOMEN W/CONTRAST: CPT

## 2024-09-24 ENCOUNTER — OUTPATIENT (OUTPATIENT)
Dept: OUTPATIENT SERVICES | Facility: HOSPITAL | Age: 39
LOS: 1 days | End: 2024-09-24
Payer: SELF-PAY

## 2024-09-24 ENCOUNTER — APPOINTMENT (OUTPATIENT)
Dept: INTERNAL MEDICINE | Facility: CLINIC | Age: 39
End: 2024-09-24
Payer: COMMERCIAL

## 2024-09-24 VITALS
OXYGEN SATURATION: 97 % | TEMPERATURE: 97.5 F | DIASTOLIC BLOOD PRESSURE: 71 MMHG | BODY MASS INDEX: 32.36 KG/M2 | HEART RATE: 85 BPM | HEIGHT: 57 IN | SYSTOLIC BLOOD PRESSURE: 106 MMHG | WEIGHT: 150 LBS

## 2024-09-24 DIAGNOSIS — M54.50 LOW BACK PAIN, UNSPECIFIED: ICD-10-CM

## 2024-09-24 DIAGNOSIS — R10.9 UNSPECIFIED ABDOMINAL PAIN: ICD-10-CM

## 2024-09-24 DIAGNOSIS — Z00.00 ENCOUNTER FOR GENERAL ADULT MEDICAL EXAMINATION WITHOUT ABNORMAL FINDINGS: ICD-10-CM

## 2024-09-24 PROCEDURE — ZZZZZ: CPT

## 2024-09-24 PROCEDURE — G0463: CPT

## 2024-09-24 RX ORDER — DICLOFENAC SODIUM 10 MG/G
1 GEL TOPICAL DAILY
Qty: 1 | Refills: 2 | Status: ACTIVE | COMMUNITY
Start: 2024-09-24 | End: 1900-01-01

## 2024-09-24 RX ORDER — OMEPRAZOLE 20 MG/1
20 TABLET, DELAYED RELEASE ORAL
Qty: 1 | Refills: 0 | Status: ACTIVE | COMMUNITY
Start: 2024-09-24 | End: 1900-01-01

## 2024-09-24 NOTE — HISTORY OF PRESENT ILLNESS
[FreeTextEntry1] : follow-up [de-identified] : Patient is a 39 year old female with PMHx of RA, HLD, pyelonephritis, gastritis and PCOS here for a follow up. She has intermittent lower back pain, which has increased over the last 2 weeks, especially while sitting down. Endorses the pain to be 8/10, squeezing type of intermittent pain. She took ibuprofen for the back pain which temporarily gave her relief. 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 This is the first time she has felt pain like this. Denies numbness, tingling, fall or trauma to the area. Her epigastric pain has improved since the last time she was here, is very mild and does not bother her anymore. Denies dysuria, pain with urination, pain in lower back while walking.

## 2024-09-24 NOTE — INTERPRETER SERVICES
[Time Spent: ____ minutes] : Total time spent using  services: [unfilled] minutes. The patient's primary language is not English thus required  services. [Interpreters_IDNumber] : [id number] [Interpreters_FullName] : Juanjo [TWNoteComboBox1] : Polish

## 2024-09-24 NOTE — END OF VISIT
[] : Resident [FreeTextEntry3] : I personally discussed this patient with the Resident at the time of the visit. I agree with the assessment and plan as written, unless noted below.I have personally examined the patient. All questions addressed. Advised to follow-up in  I personally discussed this patient with the Resident at the time of the visit. I agree with the assessment and plan as written, unless noted below. I was present with the Resident during the key portions of the history and exam. I agree with the findings and plan as documented in the Resident 's note, unless noted below. I, Dr. Adolfo Cm, saw and evaluated this patient in the presence of the resident. I discussed the management with the resident. I reviewed the note and agreed with the documented plan of care with the following confirmation/ corrections/ additions: None. [Time Spent: ___ minutes] : I have spent [unfilled] minutes of time on the encounter which excludes teaching and separately reported services.

## 2024-09-24 NOTE — ASSESSMENT
[FreeTextEntry1] : Patient is a 39 year old female with PMHx of RA, HLD, pyelonephritis, gastritis and PCOS here for a follow up, complains of lower back pain.  #Lower lumbar spinal and left paraspinal pain - increases on sitting down, +Straight leg raise test - CT spine showed mild spondylosis with broad-based disc bulge at L5-S1 resulting in mild right neural foraminal stenosis - ordered diclofenac gel - Physical therapy follow up - follow up in 3 months.  #Epigastric/ RUQ pain - likely 2/2 gastritis - no TTP - CT abdomen showed less distention of the dilated cystic duct remnant measuring up to 1.5 cm in diameter. No biliary dilation - c/w omeprazole 20mg  Total time spent caring for the patient today was 30 minutes. This includes time spent before the visit reviewing the chart, time spent during visit, and time spent after the visit on documentation, etc.  Follow-up in 3 months

## 2024-09-24 NOTE — PHYSICAL EXAM
[No Acute Distress] : no acute distress [Well Nourished] : well nourished [Well Developed] : well developed [Well-Appearing] : well-appearing [Normal Sclera/Conjunctiva] : normal sclera/conjunctiva [PERRL] : pupils equal round and reactive to light [EOMI] : extraocular movements intact [Normal Outer Ear/Nose] : the outer ears and nose were normal in appearance [Normal Oropharynx] : the oropharynx was normal [No JVD] : no jugular venous distention [No Lymphadenopathy] : no lymphadenopathy [Supple] : supple [Thyroid Normal, No Nodules] : the thyroid was normal and there were no nodules present [No Respiratory Distress] : no respiratory distress  [No Accessory Muscle Use] : no accessory muscle use [Clear to Auscultation] : lungs were clear to auscultation bilaterally [Normal Rate] : normal rate  [Regular Rhythm] : with a regular rhythm [Normal S1, S2] : normal S1 and S2 [No Murmur] : no murmur heard [No Carotid Bruits] : no carotid bruits [No Abdominal Bruit] : a ~M bruit was not heard ~T in the abdomen [No Varicosities] : no varicosities [Pedal Pulses Present] : the pedal pulses are present [No Edema] : there was no peripheral edema [No Palpable Aorta] : no palpable aorta [No Extremity Clubbing/Cyanosis] : no extremity clubbing/cyanosis [Soft] : abdomen soft [Non Tender] : non-tender [Non-distended] : non-distended [No Masses] : no abdominal mass palpated [No HSM] : no HSM [Normal Bowel Sounds] : normal bowel sounds [Normal Posterior Cervical Nodes] : no posterior cervical lymphadenopathy [Normal Anterior Cervical Nodes] : no anterior cervical lymphadenopathy [No CVA Tenderness] : no CVA  tenderness [No Joint Swelling] : no joint swelling [Grossly Normal Strength/Tone] : grossly normal strength/tone [No Rash] : no rash [Coordination Grossly Intact] : coordination grossly intact [No Focal Deficits] : no focal deficits [Normal Gait] : normal gait [Deep Tendon Reflexes (DTR)] : deep tendon reflexes were 2+ and symmetric [Normal Affect] : the affect was normal [Normal Insight/Judgement] : insight and judgment were intact [de-identified] : lumbar spinal tenderness, left paraspinal tenderness.

## 2024-09-26 DIAGNOSIS — M54.50 LOW BACK PAIN, UNSPECIFIED: ICD-10-CM

## 2024-10-11 ENCOUNTER — RESULT REVIEW (OUTPATIENT)
Age: 39
End: 2024-10-11

## 2024-10-11 ENCOUNTER — OUTPATIENT (OUTPATIENT)
Dept: OUTPATIENT SERVICES | Facility: HOSPITAL | Age: 39
LOS: 1 days | End: 2024-10-11
Payer: SELF-PAY

## 2024-10-11 ENCOUNTER — APPOINTMENT (OUTPATIENT)
Dept: RHEUMATOLOGY | Facility: HOSPITAL | Age: 39
End: 2024-10-11

## 2024-10-11 VITALS
RESPIRATION RATE: 16 BRPM | HEART RATE: 63 BPM | TEMPERATURE: 97.8 F | DIASTOLIC BLOOD PRESSURE: 69 MMHG | OXYGEN SATURATION: 96 % | SYSTOLIC BLOOD PRESSURE: 106 MMHG

## 2024-10-11 DIAGNOSIS — M06.9 RHEUMATOID ARTHRITIS, UNSPECIFIED: ICD-10-CM

## 2024-10-11 LAB
ALBUMIN SERPL ELPH-MCNC: 4.1 G/DL — SIGNIFICANT CHANGE UP (ref 3.3–5)
ALP SERPL-CCNC: 97 U/L — SIGNIFICANT CHANGE UP (ref 40–120)
ALT FLD-CCNC: 23 U/L — SIGNIFICANT CHANGE UP (ref 10–45)
ANION GAP SERPL CALC-SCNC: 14 MMOL/L — SIGNIFICANT CHANGE UP (ref 5–17)
ANTI-RIBONUCLEAR PROTEIN: 0.2 AI — SIGNIFICANT CHANGE UP
AST SERPL-CCNC: 11 U/L — SIGNIFICANT CHANGE UP (ref 10–40)
BASOPHILS # BLD AUTO: 0.05 K/UL — SIGNIFICANT CHANGE UP (ref 0–0.2)
BASOPHILS NFR BLD AUTO: 0.5 % — SIGNIFICANT CHANGE UP (ref 0–2)
BILIRUB SERPL-MCNC: 0.3 MG/DL — SIGNIFICANT CHANGE UP (ref 0.2–1.2)
BUN SERPL-MCNC: 18 MG/DL — SIGNIFICANT CHANGE UP (ref 7–23)
CALCIUM SERPL-MCNC: 9 MG/DL — SIGNIFICANT CHANGE UP (ref 8.4–10.5)
CHLORIDE SERPL-SCNC: 103 MMOL/L — SIGNIFICANT CHANGE UP (ref 96–108)
CO2 SERPL-SCNC: 24 MMOL/L — SIGNIFICANT CHANGE UP (ref 22–31)
CREAT ?TM UR-MCNC: 46 MG/DL — SIGNIFICANT CHANGE UP
CREAT SERPL-MCNC: 0.8 MG/DL — SIGNIFICANT CHANGE UP (ref 0.5–1.3)
CRP SERPL-MCNC: 6 MG/L — HIGH
DSDNA AB FLD-ACNC: <0.2 AI — SIGNIFICANT CHANGE UP
DSDNA AB SER-ACNC: <1 IU/ML — SIGNIFICANT CHANGE UP
EGFR: 97 ML/MIN/1.73M2 — SIGNIFICANT CHANGE UP
ENA SCL70 AB SER-ACNC: <0.2 AI — SIGNIFICANT CHANGE UP
ENA SM AB FLD QL: <0.2 AI — SIGNIFICANT CHANGE UP
ENA SS-A AB FLD IA-ACNC: <0.2 AI — SIGNIFICANT CHANGE UP
EOSINOPHIL # BLD AUTO: 0.07 K/UL — SIGNIFICANT CHANGE UP (ref 0–0.5)
EOSINOPHIL NFR BLD AUTO: 0.7 % — SIGNIFICANT CHANGE UP (ref 0–6)
GLUCOSE SERPL-MCNC: 87 MG/DL — SIGNIFICANT CHANGE UP (ref 70–99)
HCT VFR BLD CALC: 35.6 % — SIGNIFICANT CHANGE UP (ref 34.5–45)
HGB BLD-MCNC: 11.2 G/DL — LOW (ref 11.5–15.5)
IMM GRANULOCYTES NFR BLD AUTO: 1.4 % — HIGH (ref 0–0.9)
LYMPHOCYTES # BLD AUTO: 2.24 K/UL — SIGNIFICANT CHANGE UP (ref 1–3.3)
LYMPHOCYTES # BLD AUTO: 22.4 % — SIGNIFICANT CHANGE UP (ref 13–44)
MCHC RBC-ENTMCNC: 27.5 PG — SIGNIFICANT CHANGE UP (ref 27–34)
MCHC RBC-ENTMCNC: 31.5 GM/DL — LOW (ref 32–36)
MCV RBC AUTO: 87.5 FL — SIGNIFICANT CHANGE UP (ref 80–100)
MONOCYTES # BLD AUTO: 0.99 K/UL — HIGH (ref 0–0.9)
MONOCYTES NFR BLD AUTO: 9.9 % — SIGNIFICANT CHANGE UP (ref 2–14)
NEUTROPHILS # BLD AUTO: 6.52 K/UL — SIGNIFICANT CHANGE UP (ref 1.8–7.4)
NEUTROPHILS NFR BLD AUTO: 65.1 % — SIGNIFICANT CHANGE UP (ref 43–77)
PLATELET # BLD AUTO: 290 K/UL — SIGNIFICANT CHANGE UP (ref 150–400)
POTASSIUM SERPL-MCNC: 3.8 MMOL/L — SIGNIFICANT CHANGE UP (ref 3.5–5.3)
POTASSIUM SERPL-SCNC: 3.8 MMOL/L — SIGNIFICANT CHANGE UP (ref 3.5–5.3)
PROT ?TM UR-MCNC: 4 MG/DL — SIGNIFICANT CHANGE UP (ref 0–12)
PROT SERPL-MCNC: 6.5 G/DL — SIGNIFICANT CHANGE UP (ref 6–8.3)
PROT/CREAT UR-RTO: 0.1 RATIO — SIGNIFICANT CHANGE UP (ref 0–0.2)
RBC # BLD: 4.07 M/UL — SIGNIFICANT CHANGE UP (ref 3.8–5.2)
RBC # FLD: 15.8 % — HIGH (ref 10.3–14.5)
SODIUM SERPL-SCNC: 141 MMOL/L — SIGNIFICANT CHANGE UP (ref 135–145)
WBC # BLD: 10.01 K/UL — SIGNIFICANT CHANGE UP (ref 3.8–10.5)
WBC # FLD AUTO: 10.01 K/UL — SIGNIFICANT CHANGE UP (ref 3.8–10.5)

## 2024-10-11 PROCEDURE — 86235 NUCLEAR ANTIGEN ANTIBODY: CPT

## 2024-10-11 PROCEDURE — 84156 ASSAY OF PROTEIN URINE: CPT

## 2024-10-11 PROCEDURE — 86160 COMPLEMENT ANTIGEN: CPT

## 2024-10-11 PROCEDURE — 86706 HEP B SURFACE ANTIBODY: CPT

## 2024-10-11 PROCEDURE — 36415 COLL VENOUS BLD VENIPUNCTURE: CPT

## 2024-10-11 PROCEDURE — 86140 C-REACTIVE PROTEIN: CPT

## 2024-10-11 PROCEDURE — G0008: CPT

## 2024-10-11 PROCEDURE — 85025 COMPLETE CBC W/AUTO DIFF WBC: CPT

## 2024-10-11 PROCEDURE — 90688 IIV4 VACCINE SPLT 0.5 ML IM: CPT

## 2024-10-11 PROCEDURE — 73130 X-RAY EXAM OF HAND: CPT | Mod: 26,50

## 2024-10-11 PROCEDURE — ZZZZZ: CPT

## 2024-10-11 PROCEDURE — 80053 COMPREHEN METABOLIC PANEL: CPT

## 2024-10-11 PROCEDURE — 87340 HEPATITIS B SURFACE AG IA: CPT

## 2024-10-11 PROCEDURE — 82570 ASSAY OF URINE CREATININE: CPT

## 2024-10-11 PROCEDURE — G0463: CPT

## 2024-10-11 PROCEDURE — 86704 HEP B CORE ANTIBODY TOTAL: CPT

## 2024-10-11 PROCEDURE — 86705 HEP B CORE ANTIBODY IGM: CPT

## 2024-10-11 PROCEDURE — 86480 TB TEST CELL IMMUN MEASURE: CPT

## 2024-10-11 PROCEDURE — 86225 DNA ANTIBODY NATIVE: CPT

## 2024-10-11 PROCEDURE — 73130 X-RAY EXAM OF HAND: CPT

## 2024-10-11 RX ORDER — METHOTREXATE 2.5 MG/1
2.5 TABLET ORAL
Qty: 78 | Refills: 1 | Status: ACTIVE | COMMUNITY
Start: 2024-10-11 | End: 1900-01-01

## 2024-10-11 RX ORDER — FOLIC ACID 1 MG/1
1 TABLET ORAL DAILY
Qty: 90 | Refills: 2 | Status: ACTIVE | COMMUNITY
Start: 2024-10-11 | End: 1900-01-01

## 2024-10-12 LAB
C3 SERPL-MCNC: 140 MG/DL — SIGNIFICANT CHANGE UP (ref 81–157)
C4 SERPL-MCNC: 16 MG/DL — SIGNIFICANT CHANGE UP (ref 13–39)
HBV CORE AB SER-ACNC: SIGNIFICANT CHANGE UP
HBV CORE IGM SER-ACNC: SIGNIFICANT CHANGE UP
HBV SURFACE AB SER-ACNC: SIGNIFICANT CHANGE UP
HBV SURFACE AG SER-ACNC: SIGNIFICANT CHANGE UP

## 2024-10-15 LAB
GAMMA INTERFERON BACKGROUND BLD IA-ACNC: 0.04 IU/ML — SIGNIFICANT CHANGE UP
M TB IFN-G BLD-IMP: NEGATIVE — SIGNIFICANT CHANGE UP
M TB IFN-G CD4+ BCKGRND COR BLD-ACNC: 0 IU/ML — SIGNIFICANT CHANGE UP
M TB IFN-G CD4+CD8+ BCKGRND COR BLD-ACNC: 0.02 IU/ML — SIGNIFICANT CHANGE UP
QUANT TB PLUS MITOGEN MINUS NIL: >10 IU/ML — SIGNIFICANT CHANGE UP

## 2024-11-08 ENCOUNTER — APPOINTMENT (OUTPATIENT)
Dept: RHEUMATOLOGY | Facility: HOSPITAL | Age: 39
End: 2024-11-08

## 2024-11-08 ENCOUNTER — OUTPATIENT (OUTPATIENT)
Dept: OUTPATIENT SERVICES | Facility: HOSPITAL | Age: 39
LOS: 1 days | End: 2024-11-08
Payer: SELF-PAY

## 2024-11-08 VITALS
HEART RATE: 89 BPM | DIASTOLIC BLOOD PRESSURE: 74 MMHG | WEIGHT: 150 LBS | RESPIRATION RATE: 16 BRPM | SYSTOLIC BLOOD PRESSURE: 116 MMHG | OXYGEN SATURATION: 98 % | BODY MASS INDEX: 32.36 KG/M2 | HEIGHT: 57 IN | TEMPERATURE: 98 F

## 2024-11-08 DIAGNOSIS — M06.9 RHEUMATOID ARTHRITIS, UNSPECIFIED: ICD-10-CM

## 2024-11-08 DIAGNOSIS — D64.9 ANEMIA, UNSPECIFIED: ICD-10-CM

## 2024-11-08 LAB
FERRITIN SERPL-MCNC: 65 NG/ML — SIGNIFICANT CHANGE UP (ref 15–150)
IRON SATN MFR SERPL: 21 UG/DL — LOW (ref 30–160)
IRON SATN MFR SERPL: 7 % — LOW (ref 14–50)
TIBC SERPL-MCNC: 290 UG/DL — SIGNIFICANT CHANGE UP (ref 220–430)
TRANSFERRIN SERPL-MCNC: 240 MG/DL — SIGNIFICANT CHANGE UP (ref 200–360)
UIBC SERPL-MCNC: 269 UG/DL — SIGNIFICANT CHANGE UP (ref 110–370)

## 2024-11-08 PROCEDURE — 84466 ASSAY OF TRANSFERRIN: CPT

## 2024-11-08 PROCEDURE — 83550 IRON BINDING TEST: CPT

## 2024-11-08 PROCEDURE — ZZZZZ: CPT

## 2024-11-08 PROCEDURE — 83540 ASSAY OF IRON: CPT

## 2024-11-08 PROCEDURE — G0463: CPT

## 2024-11-08 PROCEDURE — 82728 ASSAY OF FERRITIN: CPT

## 2024-11-08 RX ORDER — METHOTREXATE 2.5 MG/1
2.5 TABLET ORAL
Qty: 43 | Refills: 0 | Status: ACTIVE | COMMUNITY
Start: 2024-11-08 | End: 1900-01-01

## 2024-11-11 DIAGNOSIS — D64.9 ANEMIA, UNSPECIFIED: ICD-10-CM

## 2024-11-12 RX ORDER — METHYLPREDNISOLONE 4 MG/1
4 TABLET ORAL
Qty: 1 | Refills: 0 | Status: ACTIVE | COMMUNITY
Start: 2024-11-12 | End: 1900-01-01

## 2024-11-12 RX ORDER — FAMOTIDINE 20 MG/1
20 TABLET, FILM COATED ORAL
Qty: 120 | Refills: 0 | Status: ACTIVE | COMMUNITY
Start: 2024-11-11 | End: 1900-01-01

## 2024-11-26 NOTE — REVIEW OF SYSTEMS
----- Message from Reinaldo Gutierrez DO sent at 11/25/2024  4:26 PM CST -----  Recommend completing Cipro as prescribed.  ----- Message -----  From: Zayra Soto RN  Sent: 11/25/2024  12:51 PM CST  To: Reinaldo Gutierrez DO    Per 11/19/24 notation, patient was prescribed Cipro 500mg 1 tab twice daily for 5 days for UTI symptoms. Please advise.   Writer spoke with patient.  Writer relayed results and Dr Gutierrez's instructions. Patient verbalized understanding and reports has completed the cipro regimen.  Patient reports improvement/resolution in symptoms-- is continuing to drink plenty of fluids.    Route to Mike- update/fyi.    Septic [As Noted in HPI] : as noted in HPI [Dizziness] : dizziness [Negative] : Psychiatric [Fever] : no fever [Chills] : no chills [Eye Pain] : no eye pain [Chest Pain] : no chest pain [Shortness Of Breath] : no shortness of breath

## 2024-12-09 ENCOUNTER — EMERGENCY (EMERGENCY)
Facility: HOSPITAL | Age: 39
LOS: 1 days | Discharge: HOME | End: 2024-12-09
Attending: EMERGENCY MEDICINE
Payer: MEDICAID

## 2024-12-09 VITALS
TEMPERATURE: 98 F | SYSTOLIC BLOOD PRESSURE: 118 MMHG | HEART RATE: 96 BPM | DIASTOLIC BLOOD PRESSURE: 74 MMHG | OXYGEN SATURATION: 97 % | WEIGHT: 154.98 LBS | RESPIRATION RATE: 17 BRPM

## 2024-12-09 VITALS
TEMPERATURE: 99 F | RESPIRATION RATE: 18 BRPM | DIASTOLIC BLOOD PRESSURE: 69 MMHG | HEART RATE: 82 BPM | OXYGEN SATURATION: 99 % | SYSTOLIC BLOOD PRESSURE: 121 MMHG

## 2024-12-09 PROCEDURE — 96376 TX/PRO/DX INJ SAME DRUG ADON: CPT

## 2024-12-09 PROCEDURE — 96375 TX/PRO/DX INJ NEW DRUG ADDON: CPT

## 2024-12-09 PROCEDURE — 96374 THER/PROPH/DIAG INJ IV PUSH: CPT

## 2024-12-09 PROCEDURE — 99284 EMERGENCY DEPT VISIT MOD MDM: CPT | Mod: 25

## 2024-12-09 PROCEDURE — 99284 EMERGENCY DEPT VISIT MOD MDM: CPT

## 2024-12-09 RX ORDER — KETOROLAC TROMETHAMINE 30 MG/ML
15 INJECTION INTRAMUSCULAR; INTRAVENOUS ONCE
Refills: 0 | Status: DISCONTINUED | OUTPATIENT
Start: 2024-12-09 | End: 2024-12-09

## 2024-12-09 RX ORDER — METOCLOPRAMIDE HYDROCHLORIDE 10 MG/1
10 TABLET ORAL ONCE
Refills: 0 | Status: COMPLETED | OUTPATIENT
Start: 2024-12-09 | End: 2024-12-09

## 2024-12-09 RX ORDER — IBUPROFEN 200 MG
1 TABLET ORAL
Qty: 21 | Refills: 0
Start: 2024-12-09 | End: 2024-12-15

## 2024-12-09 RX ORDER — SODIUM CHLORIDE 9 MG/ML
3 INJECTION, SOLUTION INTRAMUSCULAR; INTRAVENOUS; SUBCUTANEOUS ONCE
Refills: 0 | Status: COMPLETED | OUTPATIENT
Start: 2024-12-09 | End: 2024-12-09

## 2024-12-09 RX ADMIN — KETOROLAC TROMETHAMINE 15 MILLIGRAM(S): 30 INJECTION INTRAMUSCULAR; INTRAVENOUS at 14:58

## 2024-12-09 RX ADMIN — METOCLOPRAMIDE HYDROCHLORIDE 10 MILLIGRAM(S): 10 TABLET ORAL at 14:58

## 2024-12-09 RX ADMIN — KETOROLAC TROMETHAMINE 15 MILLIGRAM(S): 30 INJECTION INTRAMUSCULAR; INTRAVENOUS at 15:28

## 2024-12-09 RX ADMIN — KETOROLAC TROMETHAMINE 15 MILLIGRAM(S): 30 INJECTION INTRAMUSCULAR; INTRAVENOUS at 15:43

## 2024-12-09 RX ADMIN — SODIUM CHLORIDE 3 MILLILITER(S): 9 INJECTION, SOLUTION INTRAMUSCULAR; INTRAVENOUS; SUBCUTANEOUS at 15:01

## 2024-12-09 RX ADMIN — KETOROLAC TROMETHAMINE 15 MILLIGRAM(S): 30 INJECTION INTRAMUSCULAR; INTRAVENOUS at 16:43

## 2024-12-09 NOTE — ED PROVIDER NOTE - PATIENT PORTAL LINK FT
You can access the FollowMyHealth Patient Portal offered by [facility] by registering at the following website: [email or web address]. By joining [email or web address]’s FollowMyHealth portal, you will also be able to view your health information using other applications (apps) compatible with our system.

## 2024-12-09 NOTE — ED PROVIDER NOTE - NSFOLLOWUPINSTRUCTIONS_ED_ALL_ED_FT
[facility]    Cefalea migrañosa  Migraine Headache  Marlon cefalea migrañosa es un dolor intenso pulsante o punzante en chu o ambos lados de la bisi. Las cefaleas migrañosas también pueden causar otros síntomas, chi náuseas, vómitos y sensibilidad a la fam y el ruido. Marlon cefalea migrañosa puede durar desde 4 horas hasta 3 días. Hable con pugh médico sobre los factores que pueden causar (desencadenar) las cefaleas migrañosas.    ¿Cuáles son las causas?  La causa exacta se desconoce. Sin embargo, marlon migraña puede aparecer cuando los nervios del cerebro se irritan y liberan sustancias químicas que hacen que los vasos sanguíneos se inflamen. Esta inflamación provoca dolor. Las causas o los desencadenantes de las migrañas pueden ser los siguientes:  Fumar.  Medicamentos, por ejemplo:  Nitroglicerina, que se usa para tratar el dolor torácico.  Píldoras anticonceptivas.  Estrógeno.  Ciertos medicamentos para la presión arterial.  Los alimentos o las bebidas que contienen nitratos, glutamato, aspartamo, glutamato monosódico (GMS) o tiramina.  Ciertos alimentos o bebidas, chi quesos añejos, chocolate, alcohol o cafeína.  Hacer actividad física muy vigorosa.  Otros factores desencadenantes pueden incluir los siguientes:  Menstruación.  Embarazo.  Hambre.  Estrés.  Dormir demasiado o muy poco.  Cambios climáticos.  Cansancio (fatiga).  ¿Qué incrementa el riesgo?  Los siguientes factores pueden hacer que usted sea más propenso a tener cefaleas migrañosas:  Tener entre 25 y 55 años.  Ser claritza.  Tener antecedentes familiares de cefalea migrañosa.  Ser de breonna caucásica.  Tener marlon afección de hussain mental, chi depresión o ansiedad.  Ser mary anne.  ¿Cuáles son los signos o síntomas?  El principal síntoma de esta afección es el dolor pulsante o punzante. Tricia dolor puede tener las siguientes características:  Puede aparecer en cualquier región de la bisi, tanto de un lado chi de ambos.  Puede dificultar las actividades cotidianas.  Puede empeorar con la actividad física.  Puede empeorar con las luces brillantes, los ruidos apolonia o los olores.  Otros síntomas pueden incluir:  Náuseas.  Vómitos.  Mareos.  Antes de que comience marlon cefalea migrañosa, usted puede recibir señales de advertencia (un aura). Un aura puede incluir:  Kaya luces intermitentes o tener puntos ciegos.  Kaya puntos brillantes, halos o líneas en zigzag.  Tener marlon visión en túnel o visión borrosa.  Sentir entumecimiento u hormigueo.  Tener dificultad para hablar.  Debilidad muscular.  Después de que la migraña finaliza, puede tener síntomas. Pueden incluir:  Cansancio.  Dificultad para concentrarse.  ¿Cómo se diagnostica?  La cefalea migrañosa se diagnostica en función de lo siguiente:  Cady síntomas.  Un examen físico.  Estudios, chi, por ejemplo:  Exploración por tomografía computarizada (TC) o resonancia magnética (RM) de la bisi. Estos estudios pueden ayudar a descartar otras causas de cefalea.  Marlon muestra de líquido de la columna vertebral (punción lumbar) para analizarla (análisis de líquido cefalorraquídeo o análisis de LCR).  ¿Cómo se trata?  Esta afección se puede tratar con medicamentos para:  Aliviar el dolor y las náuseas.  Prevenir las migrañas.  El tratamiento también puede incluir lo siguiente:  Acupuntura.  Cambios en el estilo de anila, chi evitar los alimentos que provocan las cefaleas migrañosas.  Aprender maneras de controlar el cuerpo (biorretroalimentación).  Psicoterapia para ayudarlo a conocer y lidiar con los pensamientos negativos (terapia cognitivo conductual).  Siga estas instrucciones en pugh casa:  Medicamentos    Use los medicamentos de venta marcio y los recetados solamente chi se lo haya indicado el médico.  Pregúntele al médico si el medicamento recetado:  Requiere que evite conducir o usar maquinaria.  Puede causarle estreñimiento. Es posible que tenga que josh estas medidas para prevenir o tratar el estreñimiento:  Beber suficiente líquido para mantener el pis (orina) de color amarillo pálido.  Usar medicamentos recetados o de venta marcio.  Consumir alimentos ricos en fibra, chi frijoles, cereales integrales, y frutas y verduras frescas.  Limitar el consumo de alimentos ricos en grasa y azúcares procesados, chi los alimentos fritos o dulces.  Estilo de anila    A silhouette of a person sitting on the floor doing yoga.  No ebony alcohol.  No consuma ningún producto que contenga nicotina o tabaco. Estos productos incluyen cigarrillos, tabaco para mascar y aparatos de vapeo, chi los cigarrillos electrónicos. Si necesita ayuda para dejar de fumar, consulte al médico.  Duerma entre 7 y 9 horas todas las noches o la cantidad de horas que le haya recomendado el médico.  Encuentre modos de manejar el estrés, por ejemplo, a través de la meditación, la respiración profunda o el yoga.  Trate de realizar ejercicio a diario. [place] puede ayudar a disminuir la gravedad y la frecuencia de las migrañas.  Instrucciones generales    Mantenga un registro para descubrir qué le desencadena las migrañas, con el fin de poder evitar esas cosas. Registre, por ejemplo, lo siguiente:  Lo que usted come y hill.  El tiempo que duerme.  Algún cambio en pugh dieta o en los medicamentos.  Si tiene marlon cefalea migrañosa:  Evite los factores que empeoren los síntomas, chi las luces brillantes.  Recuéstese en marlon habitación oscura y tranquila.  No conduzca ni opere maquinaria.  Pregúntele al médico qué actividades son seguras para usted mientras tiene síntomas.  Concurra a todas las visitas de seguimiento. El médico controlará cady síntomas y le recomendará tratamiento adicional si lo necesita.  Dónde obtener más información  Coalition for Headache and Migraine Patients (CHAMP) (Coalición para Pacientes con Cefaleas y Migrañas): headachemigraine.org  American Migraine Foundation (Fundación Estadounidense para la Migraña): americanmigrainefoundation.org  National Headache Foundation (Fundación Nacional para la Cefalea): headaches.org  Comuníquese con un médico si:  Tiene síntomas de cefalea migrañosa que son distintos o peores que los habituales.  Tiene más de 15 días de cefalea por mes.  Solicite ayuda de inmediato si:  La cefalea migrañosa se pone muy intensa o dura más de 72 horas.  Tiene fiebre o rigidez en el tatum.  Presenta pérdida de la visión.  Siente debilidad en los músculos o que no puede controlarlos.  Pierde el equilibrio con frecuencia o tiene problemas para caminar.  Se desmaya.  Tiene marlon convulsión.  Esta información no tiene chi fin reemplazar el consejo del médico. Asegúrese de hacerle al médico cualquier pregunta que tenga.    Document Revised: 09/19/2023 Document Reviewed: 09/19/2023  [employer] Patient Education © 2024 [employer]  [employer] logo  Terms and Conditions  Privacy Policy  Editorial Policy  All content on this site: Copyright © 2024 Else[employer], its licensors, and contributors. All rights are reserved, including those for text and data mining, AI training, and similar technologies. For all open access content, the Creative Commons licensing terms apply.  Cookies are used by this site. To decline or learn more, visit our Cookies page.  [employer]

## 2024-12-09 NOTE — ED PROVIDER NOTE - CLINICAL SUMMARY MEDICAL DECISION MAKING FREE TEXT BOX
likely migraine versus tension versus cluster headache will treat with Toradol and Reglan and reevaluate

## 2024-12-09 NOTE — ED PROVIDER NOTE - OBJECTIVE STATEMENT
Patient is a 39-year-old female complaining of bilateral headaches associated with dizziness and nausea for the past week no actual vomiting no fever no chills patient denies history of migraines last menstrual period is right now

## 2024-12-13 ENCOUNTER — OUTPATIENT (OUTPATIENT)
Dept: OUTPATIENT SERVICES | Facility: HOSPITAL | Age: 39
LOS: 1 days | End: 2024-12-13
Payer: SELF-PAY

## 2024-12-13 ENCOUNTER — APPOINTMENT (OUTPATIENT)
Dept: RHEUMATOLOGY | Facility: HOSPITAL | Age: 39
End: 2024-12-13

## 2024-12-13 VITALS
SYSTOLIC BLOOD PRESSURE: 111 MMHG | HEART RATE: 76 BPM | DIASTOLIC BLOOD PRESSURE: 75 MMHG | TEMPERATURE: 98.06 F | RESPIRATION RATE: 14 BRPM

## 2024-12-13 DIAGNOSIS — K29.70 GASTRITIS, UNSPECIFIED, W/OUT BLEEDING: ICD-10-CM

## 2024-12-13 DIAGNOSIS — K29.70 GASTRITIS, UNSPECIFIED, WITHOUT BLEEDING: ICD-10-CM

## 2024-12-13 DIAGNOSIS — M06.9 RHEUMATOID ARTHRITIS, UNSPECIFIED: ICD-10-CM

## 2024-12-13 DIAGNOSIS — E66.9 OBESITY, UNSPECIFIED: ICD-10-CM

## 2024-12-13 DIAGNOSIS — K76.0 FATTY (CHANGE OF) LIVER, NOT ELSEWHERE CLASSIFIED: ICD-10-CM

## 2024-12-13 PROBLEM — Z78.9 OTHER SPECIFIED HEALTH STATUS: Chronic | Status: ACTIVE | Noted: 2024-12-09

## 2024-12-13 LAB
ALBUMIN SERPL ELPH-MCNC: 4.3 G/DL — SIGNIFICANT CHANGE UP (ref 3.3–5)
ALP SERPL-CCNC: 103 U/L — SIGNIFICANT CHANGE UP (ref 40–120)
ALT FLD-CCNC: 26 U/L — SIGNIFICANT CHANGE UP (ref 10–45)
ANION GAP SERPL CALC-SCNC: 14 MMOL/L — SIGNIFICANT CHANGE UP (ref 5–17)
AST SERPL-CCNC: 15 U/L — SIGNIFICANT CHANGE UP (ref 10–40)
BILIRUB SERPL-MCNC: 0.5 MG/DL — SIGNIFICANT CHANGE UP (ref 0.2–1.2)
BUN SERPL-MCNC: 10 MG/DL — SIGNIFICANT CHANGE UP (ref 7–23)
CALCIUM SERPL-MCNC: 9.2 MG/DL — SIGNIFICANT CHANGE UP (ref 8.4–10.5)
CHLORIDE SERPL-SCNC: 103 MMOL/L — SIGNIFICANT CHANGE UP (ref 96–108)
CO2 SERPL-SCNC: 22 MMOL/L — SIGNIFICANT CHANGE UP (ref 22–31)
CREAT SERPL-MCNC: 0.58 MG/DL — SIGNIFICANT CHANGE UP (ref 0.5–1.3)
CRP SERPL-MCNC: <3 MG/L — SIGNIFICANT CHANGE UP
EGFR: 119 ML/MIN/1.73M2 — SIGNIFICANT CHANGE UP
ERYTHROCYTE [SEDIMENTATION RATE] IN BLOOD: 15 MM/HR — SIGNIFICANT CHANGE UP (ref 0–15)
GLUCOSE SERPL-MCNC: 101 MG/DL — HIGH (ref 70–99)
HCT VFR BLD CALC: 34.5 % — SIGNIFICANT CHANGE UP (ref 34.5–45)
HGB BLD-MCNC: 11.1 G/DL — LOW (ref 11.5–15.5)
MCHC RBC-ENTMCNC: 28.9 PG — SIGNIFICANT CHANGE UP (ref 27–34)
MCHC RBC-ENTMCNC: 32.2 G/DL — SIGNIFICANT CHANGE UP (ref 32–36)
MCV RBC AUTO: 89.8 FL — SIGNIFICANT CHANGE UP (ref 80–100)
PLATELET # BLD AUTO: 306 K/UL — SIGNIFICANT CHANGE UP (ref 150–400)
POTASSIUM SERPL-MCNC: 4.1 MMOL/L — SIGNIFICANT CHANGE UP (ref 3.5–5.3)
POTASSIUM SERPL-SCNC: 4.1 MMOL/L — SIGNIFICANT CHANGE UP (ref 3.5–5.3)
PROT SERPL-MCNC: 7 G/DL — SIGNIFICANT CHANGE UP (ref 6–8.3)
RBC # BLD: 3.84 M/UL — SIGNIFICANT CHANGE UP (ref 3.8–5.2)
RBC # FLD: 16.2 % — HIGH (ref 10.3–14.5)
SODIUM SERPL-SCNC: 139 MMOL/L — SIGNIFICANT CHANGE UP (ref 135–145)
WBC # BLD: 5.52 K/UL — SIGNIFICANT CHANGE UP (ref 3.8–10.5)
WBC # FLD AUTO: 5.52 K/UL — SIGNIFICANT CHANGE UP (ref 3.8–10.5)

## 2024-12-13 PROCEDURE — 80053 COMPREHEN METABOLIC PANEL: CPT

## 2024-12-13 PROCEDURE — 85652 RBC SED RATE AUTOMATED: CPT

## 2024-12-13 PROCEDURE — 85027 COMPLETE CBC AUTOMATED: CPT

## 2024-12-13 PROCEDURE — G0463: CPT

## 2024-12-13 PROCEDURE — 86140 C-REACTIVE PROTEIN: CPT

## 2024-12-13 PROCEDURE — ZZZZZ: CPT

## 2024-12-13 RX ORDER — PANTOPRAZOLE 40 MG/1
40 TABLET, DELAYED RELEASE ORAL TWICE DAILY
Qty: 180 | Refills: 0 | Status: ACTIVE | COMMUNITY
Start: 2024-12-13 | End: 1900-01-01

## 2025-01-14 ENCOUNTER — NON-APPOINTMENT (OUTPATIENT)
Age: 40
End: 2025-01-14

## 2025-01-14 ENCOUNTER — OUTPATIENT (OUTPATIENT)
Dept: OUTPATIENT SERVICES | Facility: HOSPITAL | Age: 40
LOS: 1 days | End: 2025-01-14
Payer: MEDICAID

## 2025-01-14 ENCOUNTER — APPOINTMENT (OUTPATIENT)
Dept: INTERNAL MEDICINE | Facility: CLINIC | Age: 40
End: 2025-01-14
Payer: COMMERCIAL

## 2025-01-14 VITALS
BODY MASS INDEX: 32.36 KG/M2 | WEIGHT: 150 LBS | OXYGEN SATURATION: 98 % | TEMPERATURE: 97.3 F | HEIGHT: 57 IN | HEART RATE: 73 BPM | DIASTOLIC BLOOD PRESSURE: 66 MMHG | RESPIRATION RATE: 16 BRPM | SYSTOLIC BLOOD PRESSURE: 103 MMHG

## 2025-01-14 DIAGNOSIS — E78.1 PURE HYPERGLYCERIDEMIA: ICD-10-CM

## 2025-01-14 DIAGNOSIS — J32.9 CHRONIC SINUSITIS, UNSPECIFIED: ICD-10-CM

## 2025-01-14 DIAGNOSIS — Z00.00 ENCOUNTER FOR GENERAL ADULT MEDICAL EXAMINATION WITHOUT ABNORMAL FINDINGS: ICD-10-CM

## 2025-01-14 DIAGNOSIS — E66.9 OBESITY, UNSPECIFIED: ICD-10-CM

## 2025-01-14 DIAGNOSIS — M06.9 RHEUMATOID ARTHRITIS, UNSPECIFIED: ICD-10-CM

## 2025-01-14 DIAGNOSIS — E78.5 HYPERLIPIDEMIA, UNSPECIFIED: ICD-10-CM

## 2025-01-14 PROCEDURE — 36415 COLL VENOUS BLD VENIPUNCTURE: CPT

## 2025-01-14 PROCEDURE — G0463: CPT

## 2025-01-14 PROCEDURE — 80061 LIPID PANEL: CPT

## 2025-01-14 PROCEDURE — 83036 HEMOGLOBIN GLYCOSYLATED A1C: CPT

## 2025-01-14 PROCEDURE — ZZZZZ: CPT

## 2025-01-14 RX ORDER — FLUTICASONE PROPIONATE 50 UG/1
50 SPRAY, METERED NASAL
Qty: 1 | Refills: 0 | Status: ACTIVE | COMMUNITY
Start: 2025-01-14 | End: 1900-01-01

## 2025-01-14 RX ORDER — CEFPODOXIME PROXETIL 200 MG/1
200 TABLET, FILM COATED ORAL TWICE DAILY
Qty: 14 | Refills: 0 | Status: ACTIVE | COMMUNITY
Start: 2025-01-14 | End: 1900-01-01

## 2025-01-15 LAB
ESTIMATED AVERAGE GLUCOSE: 108 MG/DL
HBA1C MFR BLD HPLC: 5.4 %

## 2025-01-16 LAB
CHOLEST SERPL-MCNC: 210 MG/DL
HDLC SERPL-MCNC: 36 MG/DL
LDLC SERPL CALC-MCNC: 129 MG/DL
NONHDLC SERPL-MCNC: 173 MG/DL
TRIGL SERPL-MCNC: 250 MG/DL

## 2025-01-16 RX ORDER — ATORVASTATIN CALCIUM 10 MG/1
10 TABLET, FILM COATED ORAL
Qty: 90 | Refills: 1 | Status: ACTIVE | COMMUNITY
Start: 2025-01-16 | End: 1900-01-01

## 2025-01-22 DIAGNOSIS — E78.5 HYPERLIPIDEMIA, UNSPECIFIED: ICD-10-CM

## 2025-01-22 DIAGNOSIS — E66.9 OBESITY, UNSPECIFIED: ICD-10-CM

## 2025-01-22 DIAGNOSIS — J32.9 CHRONIC SINUSITIS, UNSPECIFIED: ICD-10-CM

## 2025-02-11 ENCOUNTER — APPOINTMENT (OUTPATIENT)
Dept: GASTROENTEROLOGY | Facility: HOSPITAL | Age: 40
End: 2025-02-11
Payer: MEDICAID

## 2025-02-11 VITALS
RESPIRATION RATE: 16 BRPM | TEMPERATURE: 98.42 F | DIASTOLIC BLOOD PRESSURE: 73 MMHG | OXYGEN SATURATION: 98 % | HEART RATE: 88 BPM | SYSTOLIC BLOOD PRESSURE: 110 MMHG

## 2025-02-11 DIAGNOSIS — R10.13 EPIGASTRIC PAIN: ICD-10-CM

## 2025-02-11 DIAGNOSIS — G89.29 EPIGASTRIC PAIN: ICD-10-CM

## 2025-02-11 PROCEDURE — 99204 OFFICE O/P NEW MOD 45 MIN: CPT

## 2025-02-11 RX ORDER — POLYETHYLENE GLYCOL 3350 AND ELECTROLYTES WITH LEMON FLAVOR 236; 22.74; 6.74; 5.86; 2.97 G/4L; G/4L; G/4L; G/4L; G/4L
236 POWDER, FOR SOLUTION ORAL
Qty: 1 | Refills: 0 | Status: ACTIVE | COMMUNITY
Start: 2025-02-11 | End: 1900-01-01

## 2025-02-11 RX ORDER — PANTOPRAZOLE 40 MG/1
40 TABLET, DELAYED RELEASE ORAL DAILY
Qty: 90 | Refills: 3 | Status: ACTIVE | COMMUNITY
Start: 2025-02-11 | End: 1900-01-01